# Patient Record
Sex: FEMALE | Race: BLACK OR AFRICAN AMERICAN | Employment: FULL TIME | URBAN - NONMETROPOLITAN AREA
[De-identification: names, ages, dates, MRNs, and addresses within clinical notes are randomized per-mention and may not be internally consistent; named-entity substitution may affect disease eponyms.]

---

## 2017-01-23 ENCOUNTER — OFFICE VISIT (OUTPATIENT)
Dept: ENDOCRINOLOGY | Age: 62
End: 2017-01-23

## 2017-01-23 VITALS
BODY MASS INDEX: 32.5 KG/M2 | WEIGHT: 227 LBS | DIASTOLIC BLOOD PRESSURE: 66 MMHG | HEIGHT: 70 IN | HEART RATE: 66 BPM | SYSTOLIC BLOOD PRESSURE: 130 MMHG

## 2017-01-23 DIAGNOSIS — E04.2 MULTINODULAR GOITER: Primary | ICD-10-CM

## 2017-01-23 DIAGNOSIS — E11.9 TYPE 2 DIABETES MELLITUS WITHOUT COMPLICATION, WITHOUT LONG-TERM CURRENT USE OF INSULIN (HCC): ICD-10-CM

## 2017-01-23 PROCEDURE — 3017F COLORECTAL CA SCREEN DOC REV: CPT | Performed by: CLINICAL NURSE SPECIALIST

## 2017-01-23 PROCEDURE — 99214 OFFICE O/P EST MOD 30 MIN: CPT | Performed by: CLINICAL NURSE SPECIALIST

## 2017-01-23 PROCEDURE — G8427 DOCREV CUR MEDS BY ELIG CLIN: HCPCS | Performed by: CLINICAL NURSE SPECIALIST

## 2017-01-23 PROCEDURE — 1036F TOBACCO NON-USER: CPT | Performed by: CLINICAL NURSE SPECIALIST

## 2017-01-23 PROCEDURE — G8419 CALC BMI OUT NRM PARAM NOF/U: HCPCS | Performed by: CLINICAL NURSE SPECIALIST

## 2017-01-23 PROCEDURE — 3046F HEMOGLOBIN A1C LEVEL >9.0%: CPT | Performed by: CLINICAL NURSE SPECIALIST

## 2017-01-23 PROCEDURE — 3014F SCREEN MAMMO DOC REV: CPT | Performed by: CLINICAL NURSE SPECIALIST

## 2017-01-23 PROCEDURE — G8484 FLU IMMUNIZE NO ADMIN: HCPCS | Performed by: CLINICAL NURSE SPECIALIST

## 2017-01-23 RX ORDER — NAPROXEN 500 MG/1
1 TABLET ORAL 2 TIMES DAILY PRN
Refills: 2 | COMMUNITY
Start: 2016-12-30 | End: 2022-06-02

## 2017-01-23 RX ORDER — ACETAMINOPHEN 160 MG
1 TABLET,DISINTEGRATING ORAL DAILY
COMMUNITY

## 2017-01-23 ASSESSMENT — ENCOUNTER SYMPTOMS
CHOKING: 0
STRIDOR: 0
COUGH: 0
VOICE CHANGE: 0
CHEST TIGHTNESS: 0
CONSTIPATION: 0
APNEA: 0
TROUBLE SWALLOWING: 0
EYE DISCHARGE: 0
NAUSEA: 0
WHEEZING: 0
EYE ITCHING: 0
RHINORRHEA: 0
SHORTNESS OF BREATH: 0

## 2017-03-22 ENCOUNTER — TELEPHONE (OUTPATIENT)
Dept: ENDOCRINOLOGY | Age: 62
End: 2017-03-22

## 2017-03-22 DIAGNOSIS — E11.9 TYPE 2 DIABETES MELLITUS WITHOUT COMPLICATION, WITHOUT LONG-TERM CURRENT USE OF INSULIN (HCC): Primary | ICD-10-CM

## 2017-05-23 LAB
CHOLESTEROL, TOTAL: NORMAL MG/DL
CHOLESTEROL/HDL RATIO: NORMAL
HDLC SERPL-MCNC: NORMAL MG/DL (ref 35–70)
LDL CHOLESTEROL CALCULATED: 90 MG/DL (ref 0–160)
TRIGL SERPL-MCNC: NORMAL MG/DL
VLDLC SERPL CALC-MCNC: NORMAL MG/DL

## 2017-05-25 LAB
CREATININE, URINE: 101.4
MICROALBUMIN/CREAT 24H UR: 6.8 MG/G{CREAT}
MICROALBUMIN/CREAT UR-RTO: 6.7

## 2017-05-30 ENCOUNTER — OFFICE VISIT (OUTPATIENT)
Dept: ENDOCRINOLOGY | Age: 62
End: 2017-05-30

## 2017-05-30 VITALS
DIASTOLIC BLOOD PRESSURE: 64 MMHG | HEART RATE: 76 BPM | HEIGHT: 70 IN | SYSTOLIC BLOOD PRESSURE: 124 MMHG | BODY MASS INDEX: 32 KG/M2 | WEIGHT: 223.5 LBS | RESPIRATION RATE: 20 BRPM

## 2017-05-30 DIAGNOSIS — E11.9 TYPE 2 DIABETES MELLITUS WITHOUT COMPLICATION, WITHOUT LONG-TERM CURRENT USE OF INSULIN (HCC): Primary | ICD-10-CM

## 2017-05-30 DIAGNOSIS — E04.2 MULTINODULAR GOITER: ICD-10-CM

## 2017-05-30 DIAGNOSIS — N18.3 CKD (CHRONIC KIDNEY DISEASE), STAGE 3 (MODERATE): ICD-10-CM

## 2017-05-30 DIAGNOSIS — E78.2 MIXED HYPERLIPIDEMIA: ICD-10-CM

## 2017-05-30 DIAGNOSIS — I10 ESSENTIAL HYPERTENSION: ICD-10-CM

## 2017-05-30 PROCEDURE — G8417 CALC BMI ABV UP PARAM F/U: HCPCS | Performed by: INTERNAL MEDICINE

## 2017-05-30 PROCEDURE — 99214 OFFICE O/P EST MOD 30 MIN: CPT | Performed by: INTERNAL MEDICINE

## 2017-05-30 PROCEDURE — G8427 DOCREV CUR MEDS BY ELIG CLIN: HCPCS | Performed by: INTERNAL MEDICINE

## 2017-05-30 PROCEDURE — 3017F COLORECTAL CA SCREEN DOC REV: CPT | Performed by: INTERNAL MEDICINE

## 2017-05-30 PROCEDURE — 3014F SCREEN MAMMO DOC REV: CPT | Performed by: INTERNAL MEDICINE

## 2017-05-30 PROCEDURE — 1036F TOBACCO NON-USER: CPT | Performed by: INTERNAL MEDICINE

## 2017-05-30 PROCEDURE — 3046F HEMOGLOBIN A1C LEVEL >9.0%: CPT | Performed by: INTERNAL MEDICINE

## 2017-05-31 LAB
HBA1C MFR BLD: 6.6 %
T4 FREE: 1.25
TSH SERPL DL<=0.05 MIU/L-ACNC: 3.18 UIU/ML

## 2017-06-06 ENCOUNTER — OFFICE VISIT (OUTPATIENT)
Dept: NEPHROLOGY | Age: 62
End: 2017-06-06

## 2017-06-06 VITALS
DIASTOLIC BLOOD PRESSURE: 78 MMHG | HEART RATE: 81 BPM | BODY MASS INDEX: 31.57 KG/M2 | WEIGHT: 220.5 LBS | OXYGEN SATURATION: 98 % | SYSTOLIC BLOOD PRESSURE: 118 MMHG

## 2017-06-06 DIAGNOSIS — I10 ESSENTIAL HYPERTENSION: Primary | ICD-10-CM

## 2017-06-06 DIAGNOSIS — E11.9 TYPE 2 DIABETES MELLITUS WITHOUT COMPLICATION, WITHOUT LONG-TERM CURRENT USE OF INSULIN (HCC): ICD-10-CM

## 2017-06-06 PROCEDURE — 1036F TOBACCO NON-USER: CPT | Performed by: INTERNAL MEDICINE

## 2017-06-06 PROCEDURE — G8427 DOCREV CUR MEDS BY ELIG CLIN: HCPCS | Performed by: INTERNAL MEDICINE

## 2017-06-06 PROCEDURE — 3046F HEMOGLOBIN A1C LEVEL >9.0%: CPT | Performed by: INTERNAL MEDICINE

## 2017-06-06 PROCEDURE — 99204 OFFICE O/P NEW MOD 45 MIN: CPT | Performed by: INTERNAL MEDICINE

## 2017-06-06 PROCEDURE — G8417 CALC BMI ABV UP PARAM F/U: HCPCS | Performed by: INTERNAL MEDICINE

## 2017-06-06 PROCEDURE — 3014F SCREEN MAMMO DOC REV: CPT | Performed by: INTERNAL MEDICINE

## 2017-06-06 PROCEDURE — 3017F COLORECTAL CA SCREEN DOC REV: CPT | Performed by: INTERNAL MEDICINE

## 2017-06-06 ASSESSMENT — ENCOUNTER SYMPTOMS
COUGH: 1
HEARTBURN: 0
SORE THROAT: 0
EYE PAIN: 0
ABDOMINAL PAIN: 0
SHORTNESS OF BREATH: 0
NAUSEA: 0
BACK PAIN: 0
DIARRHEA: 0
VOMITING: 0

## 2017-08-30 ENCOUNTER — OFFICE VISIT (OUTPATIENT)
Dept: ENDOCRINOLOGY | Age: 62
End: 2017-08-30
Payer: COMMERCIAL

## 2017-08-30 VITALS
HEART RATE: 77 BPM | WEIGHT: 220 LBS | RESPIRATION RATE: 18 BRPM | BODY MASS INDEX: 31.5 KG/M2 | DIASTOLIC BLOOD PRESSURE: 57 MMHG | HEIGHT: 70 IN | SYSTOLIC BLOOD PRESSURE: 123 MMHG

## 2017-08-30 DIAGNOSIS — E78.2 MIXED HYPERLIPIDEMIA: ICD-10-CM

## 2017-08-30 DIAGNOSIS — E04.2 MULTINODULAR GOITER: ICD-10-CM

## 2017-08-30 DIAGNOSIS — E11.8 TYPE 2 DIABETES MELLITUS WITH COMPLICATION, WITHOUT LONG-TERM CURRENT USE OF INSULIN (HCC): Primary | ICD-10-CM

## 2017-08-30 PROCEDURE — G8417 CALC BMI ABV UP PARAM F/U: HCPCS | Performed by: CLINICAL NURSE SPECIALIST

## 2017-08-30 PROCEDURE — G8427 DOCREV CUR MEDS BY ELIG CLIN: HCPCS | Performed by: CLINICAL NURSE SPECIALIST

## 2017-08-30 PROCEDURE — 1036F TOBACCO NON-USER: CPT | Performed by: CLINICAL NURSE SPECIALIST

## 2017-08-30 PROCEDURE — 3017F COLORECTAL CA SCREEN DOC REV: CPT | Performed by: CLINICAL NURSE SPECIALIST

## 2017-08-30 PROCEDURE — 3014F SCREEN MAMMO DOC REV: CPT | Performed by: CLINICAL NURSE SPECIALIST

## 2017-08-30 PROCEDURE — 3046F HEMOGLOBIN A1C LEVEL >9.0%: CPT | Performed by: CLINICAL NURSE SPECIALIST

## 2017-08-30 PROCEDURE — 99214 OFFICE O/P EST MOD 30 MIN: CPT | Performed by: CLINICAL NURSE SPECIALIST

## 2017-08-30 RX ORDER — METFORMIN HYDROCHLORIDE 500 MG/1
500 TABLET, EXTENDED RELEASE ORAL
Qty: 30 TABLET | Refills: 4 | Status: SHIPPED | OUTPATIENT
Start: 2017-08-30 | End: 2017-12-04

## 2017-08-30 RX ORDER — ATORVASTATIN CALCIUM 20 MG/1
20 TABLET, FILM COATED ORAL DAILY
COMMUNITY
End: 2018-03-21 | Stop reason: ALTCHOICE

## 2017-08-30 ASSESSMENT — ENCOUNTER SYMPTOMS
EYE REDNESS: 0
ABDOMINAL PAIN: 0
VOICE CHANGE: 0
WHEEZING: 0
COUGH: 0
DIARRHEA: 0
CONSTIPATION: 0
CHEST TIGHTNESS: 0
SHORTNESS OF BREATH: 0
NAUSEA: 0

## 2017-11-27 DIAGNOSIS — E04.2 MULTINODULAR GOITER: ICD-10-CM

## 2017-12-04 ENCOUNTER — OFFICE VISIT (OUTPATIENT)
Dept: ENDOCRINOLOGY | Age: 62
End: 2017-12-04
Payer: COMMERCIAL

## 2017-12-04 ENCOUNTER — TELEPHONE (OUTPATIENT)
Dept: ENDOCRINOLOGY | Age: 62
End: 2017-12-04

## 2017-12-04 VITALS
BODY MASS INDEX: 31.42 KG/M2 | DIASTOLIC BLOOD PRESSURE: 73 MMHG | HEART RATE: 68 BPM | RESPIRATION RATE: 16 BRPM | WEIGHT: 219.5 LBS | HEIGHT: 70 IN | SYSTOLIC BLOOD PRESSURE: 138 MMHG

## 2017-12-04 DIAGNOSIS — E11.9 TYPE 2 DIABETES MELLITUS WITHOUT COMPLICATION, WITHOUT LONG-TERM CURRENT USE OF INSULIN (HCC): Primary | ICD-10-CM

## 2017-12-04 DIAGNOSIS — E04.2 MULTINODULAR GOITER: ICD-10-CM

## 2017-12-04 DIAGNOSIS — E78.2 MIXED HYPERLIPIDEMIA: ICD-10-CM

## 2017-12-04 PROCEDURE — G8484 FLU IMMUNIZE NO ADMIN: HCPCS | Performed by: CLINICAL NURSE SPECIALIST

## 2017-12-04 PROCEDURE — 3017F COLORECTAL CA SCREEN DOC REV: CPT | Performed by: CLINICAL NURSE SPECIALIST

## 2017-12-04 PROCEDURE — 99214 OFFICE O/P EST MOD 30 MIN: CPT | Performed by: CLINICAL NURSE SPECIALIST

## 2017-12-04 PROCEDURE — 3014F SCREEN MAMMO DOC REV: CPT | Performed by: CLINICAL NURSE SPECIALIST

## 2017-12-04 PROCEDURE — 3044F HG A1C LEVEL LT 7.0%: CPT | Performed by: CLINICAL NURSE SPECIALIST

## 2017-12-04 PROCEDURE — G8427 DOCREV CUR MEDS BY ELIG CLIN: HCPCS | Performed by: CLINICAL NURSE SPECIALIST

## 2017-12-04 PROCEDURE — G8417 CALC BMI ABV UP PARAM F/U: HCPCS | Performed by: CLINICAL NURSE SPECIALIST

## 2017-12-04 PROCEDURE — 1036F TOBACCO NON-USER: CPT | Performed by: CLINICAL NURSE SPECIALIST

## 2017-12-04 ASSESSMENT — ENCOUNTER SYMPTOMS
NAUSEA: 0
ABDOMINAL PAIN: 0
CHEST TIGHTNESS: 0
EYE REDNESS: 0
CONSTIPATION: 0
COUGH: 0
WHEEZING: 0
SHORTNESS OF BREATH: 0
VOICE CHANGE: 0
DIARRHEA: 0

## 2017-12-12 DIAGNOSIS — E11.9 TYPE 2 DIABETES MELLITUS WITHOUT COMPLICATION, WITHOUT LONG-TERM CURRENT USE OF INSULIN (HCC): Primary | ICD-10-CM

## 2018-01-08 NOTE — PROGRESS NOTES
Attending addendum:  I have reviewed NP note and relevant data in detail. I agree with findings and plans of care as documented in NP nurse note.
Hypertension     Osteoarthritis     Thyroid disease     Torn rotator cuff     Right    Type II or unspecified type diabetes mellitus without mention of complication, not stated as uncontrolled       Past Surgical History:   Procedure Laterality Date    CERVICAL [address] SURGERY  2013    CHOLECYSTECTOMY  2009   [address]  2011    SHOULDER SURGERY Left 2007    VEIN SURGERY  2015    Varicose Vein     Family History   Problem Relation Age of Onset    Diabetes Mother    [facility] Arthritis Mother     High Blood Pressure Mother     Other Mother      sarcoidosis, lung disease    Diabetes Sister     High Blood Pressure Sister     Heart Defect Sister     Diabetes Brother     Other Brother      had surgery for sarcoidosis    High Blood Pressure Sister      Social History   Substance Use Topics    Smoking status: Never Smoker    Smokeless tobacco: Never Used    Alcohol use No      Current Outpatient Prescriptions   Medication Sig Dispense Refill    metFORMIN (GLUCOPHAGE) 500 MG tablet TAKE 1 TABLET DAILY WITH SUPPER 90 tablet 1    sitaGLIPtan-metformin (JANUMET)  MG per tablet Take 1 tablet by mouth 2 times daily (with meals) (Patient taking differently: Take 1 tablet by mouth daily ) 180 tablet 1    Cholecalciferol (VITAMIN D3) 5000 UNITS TABS Take 1 tablet by mouth daily      naproxen (NAPROSYN) 500 MG tablet Take 1 tablet by mouth 2 times daily as needed  2    levocetirizine (XYZAL) 5 MG tablet Take 5 mg by mouth nightly      glucose blood VI test strips (ONE TOUCH ULTRA TEST) strip Check blood sugar 2 x daily (Dx: E11.9) 200 strip 1    ONETOUCH DELICA LANCETS FINE MISC Check blood sugar 2 x daily (Dx: E11.9) 200 each 1    aspirin 81 MG tablet Take 81 mg by mouth daily.  hydroxychloroquine (PLAQUENIL) 200 MG tablet Take 200 mg by mouth 2 times daily.  quinapril-hydrochlorothiazide (ACCURETIC) 20-12.5 MG per tablet Take 1 tablet by mouth daily.       atorvastatin (LIPITOR) 20 MG tablet

## 2018-03-14 LAB
AVERAGE GLUCOSE: NORMAL
HBA1C MFR BLD: 6.1 %

## 2018-03-21 ENCOUNTER — OFFICE VISIT (OUTPATIENT)
Dept: INTERNAL MEDICINE CLINIC | Age: 63
End: 2018-03-21
Payer: COMMERCIAL

## 2018-03-21 VITALS
BODY MASS INDEX: 31.5 KG/M2 | HEIGHT: 70 IN | DIASTOLIC BLOOD PRESSURE: 63 MMHG | HEART RATE: 63 BPM | WEIGHT: 220 LBS | SYSTOLIC BLOOD PRESSURE: 137 MMHG | RESPIRATION RATE: 16 BRPM

## 2018-03-21 DIAGNOSIS — E11.8 TYPE 2 DIABETES MELLITUS WITH COMPLICATION, WITHOUT LONG-TERM CURRENT USE OF INSULIN (HCC): Primary | ICD-10-CM

## 2018-03-21 DIAGNOSIS — E04.2 MULTINODULAR GOITER: ICD-10-CM

## 2018-03-21 PROCEDURE — 99214 OFFICE O/P EST MOD 30 MIN: CPT | Performed by: CLINICAL NURSE SPECIALIST

## 2018-03-21 PROCEDURE — 3044F HG A1C LEVEL LT 7.0%: CPT | Performed by: CLINICAL NURSE SPECIALIST

## 2018-03-21 PROCEDURE — G8427 DOCREV CUR MEDS BY ELIG CLIN: HCPCS | Performed by: CLINICAL NURSE SPECIALIST

## 2018-03-21 PROCEDURE — 3014F SCREEN MAMMO DOC REV: CPT | Performed by: CLINICAL NURSE SPECIALIST

## 2018-03-21 PROCEDURE — G8417 CALC BMI ABV UP PARAM F/U: HCPCS | Performed by: CLINICAL NURSE SPECIALIST

## 2018-03-21 PROCEDURE — 1036F TOBACCO NON-USER: CPT | Performed by: CLINICAL NURSE SPECIALIST

## 2018-03-21 PROCEDURE — 3017F COLORECTAL CA SCREEN DOC REV: CPT | Performed by: CLINICAL NURSE SPECIALIST

## 2018-03-21 PROCEDURE — G8484 FLU IMMUNIZE NO ADMIN: HCPCS | Performed by: CLINICAL NURSE SPECIALIST

## 2018-03-21 ASSESSMENT — ENCOUNTER SYMPTOMS
WHEEZING: 0
NAUSEA: 0
SHORTNESS OF BREATH: 0
EYE REDNESS: 0
ABDOMINAL PAIN: 0
VOICE CHANGE: 0
COUGH: 0
CHEST TIGHTNESS: 0
DIARRHEA: 0
CONSTIPATION: 0

## 2018-03-21 NOTE — PROGRESS NOTES
tinnitus and voice change. Eyes: Negative for redness and visual disturbance. Respiratory: Negative for cough, chest tightness, shortness of breath and wheezing. Cardiovascular: Negative for chest pain, palpitations and leg swelling. Gastrointestinal: Negative for abdominal pain, constipation, diarrhea and nausea. Endocrine: Negative. Genitourinary: Negative for difficulty urinating, dysuria, frequency and hematuria. Musculoskeletal: Negative for gait problem, myalgias and neck pain. Skin: Negative for rash. Neurological: Negative for dizziness, tremors, facial asymmetry, weakness, numbness and headaches. Hematological: Negative for adenopathy. Psychiatric/Behavioral: Negative for agitation, confusion, sleep disturbance and suicidal ideas. The patient is not nervous/anxious and is not hyperactive. Objective:     Physical Exam   Constitutional: She is oriented to person, place, and time. She appears well-developed and well-nourished. No distress. HENT:   Head: Normocephalic and atraumatic. Right Ear: External ear normal.   Left Ear: External ear normal.   Nose: Nose normal.   Eyes: Conjunctivae and EOM are normal. Pupils are equal, round, and reactive to light. Right eye exhibits no discharge. Left eye exhibits no discharge. No scleral icterus. Neck: Normal range of motion. Neck supple. No thyromegaly present. Cardiovascular: Normal rate, regular rhythm, normal heart sounds and intact distal pulses. No murmur heard. Pulmonary/Chest: Effort normal and breath sounds normal. No stridor. No respiratory distress. She has no wheezes. She has no rales. Abdominal: Soft. Bowel sounds are normal. There is no tenderness. Musculoskeletal: Normal range of motion. She exhibits no edema or tenderness. Lymphadenopathy:     She has no cervical adenopathy. Neurological: She is alert and oriented to person, place, and time. No cranial nerve deficit.  Coordination normal.   Skin: Skin Continue janumet 50/500 mg tablet - one tablet daily ( maximum daily dose of metformin 2000 mg)   Check blood sugar once daily alternate fasting with before meals and bedtime      Continue to exercise, be active as much as possible daily  Continue with portion control, healthy choices, lots of vegetables and whole grains, lean meat, fish, chicken, turkey    Follow with family doctor for elevated cholesterol at upcoming appointment and cardiologist as well. Will do periodic thyroid ultrasound for stability - last US 11/2017. Return in about 4 months (around 7/21/2018).        Electronically signed by LEONA Stewart on 3/21/2018 at 8:58 AM

## 2018-03-21 NOTE — LETTER
[facility]  Phone: [phone]  Fax: [address], APRN        March 21, 2018     Tj Moore  [address] [address]    Patient: Cecilia Rios  MR Number: [id number]  YOB: 1955  Date of Visit: 3/21/2018    Dear Dr. Tj Moore: Thank you for the request for consultation for Mary Jalloh to me for the evaluation of Type 2 Diabetes Mellitus and multinodular goiter. Below are the relevant portions of my assessment and plan of care. Assessment:       Plan:     /63 (Site: Left Arm, Position: Sitting, Cuff Size: Large Adult)   Pulse 63   Resp 16   Ht 5' 10.08\" (1.78 m)   Wt 220 lb (99.8 kg)   BMI 31.50 kg/m²    1. Type 2 diabetes mellitus with complication, without long-term current use of insulin (HCC) - good glycemic control - no change in medication at this time. Glycemic goals, monitoring, therapeutic lifestyle reviewed. A1c on follow up currently 6.1%. Tolerating DM medications without adverse effects     2. Multinodular goiter - stable sub centimeter nodules by US 11/2017 - asymptomatic -   Will follow with periotic US for stabiltiy  FNA benign 2014  Discussed symptoms to report between visits  Historically euthyroid - recheck thyroid function prior to follow up     3. Mixed hyperlipidemia -  patient was taken off statin by Dr. Shell Castelan for elevated liver panel. 3/2018 chol 244, trig 127, HDL 53, , liver panel in goal currently - discussed goals with patient and restart of statin - she relates she would like to discuss with cardiologist and PCP with scheduled upcoming appointments. 4. Hypertension - controlled with medication    5. CAD - following with cardiology - Dr. Shell Castelan    6.  History of Elevated creatinine - she is following with nephrology, Dr. Analilia Ramirez  5/2017  BUN 19, creat 1.12, GFR 54 Malb/creat 6.7 On ACE    Plan:

## 2018-06-01 LAB
BUN BLDV-MCNC: 15 MG/DL
CALCIUM SERPL-MCNC: 10 MG/DL
CHLORIDE BLD-SCNC: 104 MMOL/L
CO2: 23 MMOL/L
CREAT SERPL-MCNC: 1.18 MG/DL
GFR CALCULATED: 58
GLUCOSE BLD-MCNC: 98 MG/DL
MICROALBUMIN UR-MCNC: 6.5
POTASSIUM SERPL-SCNC: 4.6 MMOL/L
SODIUM BLD-SCNC: 141 MMOL/L

## 2018-06-05 ENCOUNTER — OFFICE VISIT (OUTPATIENT)
Dept: NEPHROLOGY | Age: 63
End: 2018-06-05
Payer: COMMERCIAL

## 2018-06-05 VITALS
HEIGHT: 70 IN | DIASTOLIC BLOOD PRESSURE: 73 MMHG | BODY MASS INDEX: 31.35 KG/M2 | SYSTOLIC BLOOD PRESSURE: 118 MMHG | HEART RATE: 72 BPM | WEIGHT: 219 LBS | OXYGEN SATURATION: 100 %

## 2018-06-05 DIAGNOSIS — I10 ESSENTIAL HYPERTENSION: ICD-10-CM

## 2018-06-05 DIAGNOSIS — N18.30 CKD (CHRONIC KIDNEY DISEASE) STAGE 3, GFR 30-59 ML/MIN (HCC): Primary | ICD-10-CM

## 2018-06-05 PROCEDURE — 1036F TOBACCO NON-USER: CPT | Performed by: INTERNAL MEDICINE

## 2018-06-05 PROCEDURE — G8417 CALC BMI ABV UP PARAM F/U: HCPCS | Performed by: INTERNAL MEDICINE

## 2018-06-05 PROCEDURE — G8427 DOCREV CUR MEDS BY ELIG CLIN: HCPCS | Performed by: INTERNAL MEDICINE

## 2018-06-05 PROCEDURE — 99213 OFFICE O/P EST LOW 20 MIN: CPT | Performed by: INTERNAL MEDICINE

## 2018-06-05 PROCEDURE — 3017F COLORECTAL CA SCREEN DOC REV: CPT | Performed by: INTERNAL MEDICINE

## 2018-06-05 RX ORDER — OMEPRAZOLE 40 MG/1
40 CAPSULE, DELAYED RELEASE ORAL DAILY
COMMUNITY

## 2018-07-17 LAB
AVERAGE GLUCOSE: NORMAL
HBA1C MFR BLD: 5.9 %
TSH SERPL DL<=0.05 MIU/L-ACNC: 3.06 UIU/ML

## 2018-07-23 ENCOUNTER — OFFICE VISIT (OUTPATIENT)
Dept: INTERNAL MEDICINE CLINIC | Age: 63
End: 2018-07-23
Payer: COMMERCIAL

## 2018-07-23 VITALS
HEIGHT: 70 IN | BODY MASS INDEX: 30.92 KG/M2 | WEIGHT: 216 LBS | SYSTOLIC BLOOD PRESSURE: 134 MMHG | RESPIRATION RATE: 16 BRPM | DIASTOLIC BLOOD PRESSURE: 66 MMHG | HEART RATE: 62 BPM

## 2018-07-23 DIAGNOSIS — E11.8 TYPE 2 DIABETES MELLITUS WITH COMPLICATION, WITHOUT LONG-TERM CURRENT USE OF INSULIN (HCC): Primary | ICD-10-CM

## 2018-07-23 DIAGNOSIS — E04.2 MULTINODULAR GOITER: ICD-10-CM

## 2018-07-23 DIAGNOSIS — E78.2 MIXED HYPERLIPIDEMIA: ICD-10-CM

## 2018-07-23 PROCEDURE — 99214 OFFICE O/P EST MOD 30 MIN: CPT | Performed by: CLINICAL NURSE SPECIALIST

## 2018-07-23 PROCEDURE — G8427 DOCREV CUR MEDS BY ELIG CLIN: HCPCS | Performed by: CLINICAL NURSE SPECIALIST

## 2018-07-23 PROCEDURE — 3017F COLORECTAL CA SCREEN DOC REV: CPT | Performed by: CLINICAL NURSE SPECIALIST

## 2018-07-23 PROCEDURE — 1036F TOBACCO NON-USER: CPT | Performed by: CLINICAL NURSE SPECIALIST

## 2018-07-23 PROCEDURE — 3044F HG A1C LEVEL LT 7.0%: CPT | Performed by: CLINICAL NURSE SPECIALIST

## 2018-07-23 PROCEDURE — G8417 CALC BMI ABV UP PARAM F/U: HCPCS | Performed by: CLINICAL NURSE SPECIALIST

## 2018-07-23 PROCEDURE — 2022F DILAT RTA XM EVC RTNOPTHY: CPT | Performed by: CLINICAL NURSE SPECIALIST

## 2018-07-23 ASSESSMENT — ENCOUNTER SYMPTOMS
VOICE CHANGE: 0
WHEEZING: 0
DIARRHEA: 0
EYE REDNESS: 0
COUGH: 0
SHORTNESS OF BREATH: 0
ABDOMINAL PAIN: 0
CONSTIPATION: 0
CHEST TIGHTNESS: 0
NAUSEA: 0

## 2018-07-23 ASSESSMENT — PATIENT HEALTH QUESTIONNAIRE - PHQ9
SUM OF ALL RESPONSES TO PHQ9 QUESTIONS 1 & 2: 0
2. FEELING DOWN, DEPRESSED OR HOPELESS: 0
SUM OF ALL RESPONSES TO PHQ QUESTIONS 1-9: 0
1. LITTLE INTEREST OR PLEASURE IN DOING THINGS: 0

## 2018-07-23 NOTE — PROGRESS NOTES
Diabetes Sister     High Blood Pressure Sister     Heart Defect Sister     Diabetes Brother     Other Brother         had surgery for sarcoidosis    High Blood Pressure Sister      Social History   Substance Use Topics    Smoking status: Never Smoker    Smokeless tobacco: Never Used    Alcohol use No      Current Outpatient Prescriptions   Medication Sig Dispense Refill    metFORMIN (GLUCOPHAGE) 500 MG tablet TAKE 1 TABLET DAILY WITH SUPPER 90 tablet 1    blood glucose test strips (ONETOUCH VERIO) strip 1 each by In Vitro route daily As needed. 100 each 3    ONE TOUCH LANCETS MISC 1 each by Does not apply route daily 100 each 3    omeprazole (PRILOSEC) 40 MG delayed release capsule Take 40 mg by mouth daily      sitaGLIPtan-metformin (JANUMET)  MG per tablet Take 1 tablet by mouth 2 times daily (with meals) 180 tablet 1    Cholecalciferol (VITAMIN D3) 2000 units CAPS Take 1 tablet by mouth daily      naproxen (NAPROSYN) 500 MG tablet Take 1 tablet by mouth 2 times daily as needed  2    levocetirizine (XYZAL) 5 MG tablet Take 5 mg by mouth nightly      glucose blood VI test strips (ONE TOUCH ULTRA TEST) strip Check blood sugar 2 x daily (Dx: E11.9) 200 strip 1    ONETOUCH DELICA LANCETS FINE MISC Check blood sugar 2 x daily (Dx: E11.9) 200 each 1    aspirin 81 MG tablet Take 81 mg by mouth daily.  hydroxychloroquine (PLAQUENIL) 200 MG tablet Take 200 mg by mouth 2 times daily.  quinapril-hydrochlorothiazide (ACCURETIC) 20-12.5 MG per tablet Take 1 tablet by mouth daily. No current facility-administered medications for this visit. No Known Allergies    Subjective:      Review of Systems   Constitutional: Negative for appetite change, chills, fatigue and unexpected weight change. HENT: Negative for hearing loss, tinnitus and voice change. Eyes: Negative for redness and visual disturbance.    Respiratory: Negative for cough, chest tightness, shortness of breath and wheezing. Cardiovascular: Negative for chest pain, palpitations and leg swelling. Gastrointestinal: Negative for abdominal pain, constipation, diarrhea and nausea. Endocrine: Negative. Genitourinary: Negative for difficulty urinating, dysuria, frequency and hematuria. Musculoskeletal: Positive for arthralgias. Negative for gait problem, myalgias and neck pain. Skin: Negative for rash. Neurological: Negative for dizziness, tremors, facial asymmetry, weakness, numbness and headaches. Hematological: Negative for adenopathy. Psychiatric/Behavioral: Negative for agitation, confusion, sleep disturbance and suicidal ideas. The patient is not nervous/anxious and is not hyperactive. Objective:     Physical Exam   Constitutional: She is oriented to person, place, and time. She appears well-developed and well-nourished. No distress. HENT:   Head: Normocephalic and atraumatic. Right Ear: External ear normal.   Left Ear: External ear normal.   Nose: Nose normal.   Eyes: Conjunctivae and EOM are normal. Pupils are equal, round, and reactive to light. Right eye exhibits no discharge. Left eye exhibits no discharge. No scleral icterus. Neck: Normal range of motion. Neck supple. No thyromegaly present. Cardiovascular: Normal rate, regular rhythm, normal heart sounds and intact distal pulses. No murmur heard. Pulmonary/Chest: Effort normal and breath sounds normal. No stridor. No respiratory distress. She has no wheezes. She has no rales. Abdominal: Soft. Bowel sounds are normal. There is no tenderness. Musculoskeletal: Normal range of motion. She exhibits no edema or tenderness. Lymphadenopathy:     She has no cervical adenopathy. Neurological: She is alert and oriented to person, place, and time. No cranial nerve deficit. Coordination normal.   Skin: Skin is warm and dry. She is not diaphoretic. Psychiatric: She has a normal mood and affect.  Her behavior is normal. Judgment and thought content normal.       /66 (Site: Right Arm, Position: Sitting, Cuff Size: Large Adult)   Pulse 62   Resp 16   Ht 5' 10.08\" (1.78 m)   Wt 216 lb (98 kg)   BMI 30.92 kg/m²     Wt Readings from Last 3 Encounters:   07/23/18 216 lb (98 kg)   06/05/18 219 lb (99.3 kg)   03/21/18 220 lb (99.8 kg)       Lab Results   Component Value Date    WBC 8.8 08/23/2016    HGB 10.1 (L) 08/23/2016    HCT 32.6 (L) 08/23/2016     08/23/2016    ALKPHOS 107 05/26/2015    ALT 17 03/06/2015    AST 22 03/06/2015     06/01/2018    K 4.6 06/01/2018     06/01/2018    CREATININE 1.18 06/01/2018    BUN 15 06/01/2018    CO2 23 06/01/2018    TSH 3.060 07/17/2018    LABA1C 5.9 07/17/2018    LABMICR 6.5 06/01/2018    LABCREA 101.4 05/25/2017       Patient was asked to remove their socks and shoes and a full foot exam was performed.  The following was found during the patient's foot exam:                  [x]   Callus   [x]   Left      [x]   Right                                       [x]   Normal Exam  Monofilament sensation   [x]   Normal                   []   Abnormal   Pulses   [x]   Normal                      []   Abnormal    No open lesions    Assessment/Plan:           Type 2 diabetes mellitus - in good control A1c 5.9% this month - no lows. tolerating medications without adverse effect - to continue. discussed glycemic goals, monitoring, therapeutic lifestyle. Adverse symptoms of medication to report    2. . Multinodular goiter - stable sub centimeter nodules by US 11/2017 - asymptomatic -   Will follow with periodic US for stabiltiy  FNA benign 2014  Discussed symptoms to report between visits  Historically euthyroid - 7/2018 TSH 3.0, FT4 1.17.    3. Mixed hyperlipidemia -  patient was taken off statin by Dr. Denilson Marie for elevated liver panel. 3/2018 chol 244, trig 127, HDL 53, , liver panel in goal currently - stated restarted on statin previously by cardiology but she stopped it for no reason. on 7/23/2018 at 12:34 PM

## 2018-07-23 NOTE — LETTER
in regard to follow up appointments with area Endocrinologists, Internal Medicine Specialists and family doctor. Patient has opted to follow with family doctor, Yasmine Hartamn spent 25 minutes with the patient. Greater than 50% of visit was spent face to face discussing glycemic management and other diagnoses as above, reviewing labs, medication, answering questions      Plan:      Continue metformin 500 mg tablet with supper   Continue janumet 50/500 mg tablet - one tablet daily ( maximum daily dose of metformin 2000 mg)   Check blood sugar once daily alternate fasting with before meals and bedtime      Continue to exercise, be active as much as possible daily  Continue with portion control, healthy choices, lots of vegetables and whole grains, lean meat, fish, chicken, turkey     Follow with family doctor for elevated cholesterol at upcoming appointment and cardiologist as well. - patient will restart lipitor 10 mg daily (from cardiologist) - advised to recheck cholesterol and liver panel six weeks after restarting - call for abdominal pain, muscle aches or other adverse effects     Will do periodic thyroid ultrasound for stability - last US 11/2017. - should repeat one year from previous if remains asymptomatic - Due 11/2018    Patient will follow with PCP, Read Beverage    No Follow-up on file. If you have questions, please do not hesitate to call me. I look forward to following Maria M along with you.     Sincerely,        LEONA Beck - CNS

## 2018-08-15 DIAGNOSIS — E11.9 TYPE 2 DIABETES MELLITUS WITHOUT COMPLICATION, WITHOUT LONG-TERM CURRENT USE OF INSULIN (HCC): ICD-10-CM

## 2018-08-16 RX ORDER — SITAGLIPTIN AND METFORMIN HYDROCHLORIDE 500; 50 MG/1; MG/1
TABLET, FILM COATED ORAL
Qty: 180 TABLET | Refills: 0 | Status: SHIPPED | OUTPATIENT
Start: 2018-08-16

## 2019-06-05 LAB
ANION GAP SERPL CALCULATED.3IONS-SCNC: 8 MMOL/L (ref 4–12)
BUN BLDV-MCNC: 25 MG/DL (ref 5–27)
CALCIUM SERPL-MCNC: 10.2 MG/DL (ref 8.5–10.5)
CHLORIDE BLD-SCNC: 106 MMOL/L (ref 98–109)
CO2: 27 MMOL/L (ref 22–32)
CREAT SERPL-MCNC: 1.26 MG/DL (ref 0.4–1)
CREATINE, URINE: 83.26 MG/DL
EGFR AFRICAN AMERICAN: 52 ML/MIN/1.73SQ.M
EGFR IF NONAFRICAN AMERICAN: 43 ML/MIN/1.73SQ.M
GLUCOSE: 74 MG/DL (ref 65–99)
POTASSIUM SERPL-SCNC: 4.4 MMOL/L (ref 3.5–5)
PROTEIN, URINE: 110 MG/L
PROTEIN/CREAT RATIO: 0.13
SODIUM BLD-SCNC: 141 MMOL/L (ref 134–146)

## 2019-06-13 ENCOUNTER — OFFICE VISIT (OUTPATIENT)
Dept: NEPHROLOGY | Age: 64
End: 2019-06-13
Payer: COMMERCIAL

## 2019-06-13 VITALS
BODY MASS INDEX: 31.07 KG/M2 | DIASTOLIC BLOOD PRESSURE: 73 MMHG | HEART RATE: 78 BPM | WEIGHT: 217 LBS | SYSTOLIC BLOOD PRESSURE: 114 MMHG | OXYGEN SATURATION: 98 %

## 2019-06-13 DIAGNOSIS — I10 ESSENTIAL HYPERTENSION: ICD-10-CM

## 2019-06-13 DIAGNOSIS — N18.30 CKD (CHRONIC KIDNEY DISEASE) STAGE 3, GFR 30-59 ML/MIN (HCC): Primary | ICD-10-CM

## 2019-06-13 PROCEDURE — 99213 OFFICE O/P EST LOW 20 MIN: CPT | Performed by: INTERNAL MEDICINE

## 2019-06-13 PROCEDURE — G8427 DOCREV CUR MEDS BY ELIG CLIN: HCPCS | Performed by: INTERNAL MEDICINE

## 2019-06-13 PROCEDURE — 1036F TOBACCO NON-USER: CPT | Performed by: INTERNAL MEDICINE

## 2019-06-13 PROCEDURE — 3017F COLORECTAL CA SCREEN DOC REV: CPT | Performed by: INTERNAL MEDICINE

## 2019-06-13 PROCEDURE — G8417 CALC BMI ABV UP PARAM F/U: HCPCS | Performed by: INTERNAL MEDICINE

## 2019-06-13 RX ORDER — LORATADINE 10 MG/1
TABLET ORAL
Refills: 3 | COMMUNITY
Start: 2019-05-17 | End: 2022-06-02

## 2019-06-13 RX ORDER — ATORVASTATIN CALCIUM 40 MG/1
TABLET, FILM COATED ORAL
COMMUNITY
Start: 2018-06-21

## 2019-06-13 RX ORDER — IBUPROFEN 200 MG
200 TABLET ORAL PRN
COMMUNITY
Start: 2018-09-11 | End: 2022-06-02

## 2019-06-13 RX ORDER — CYCLOBENZAPRINE HCL 10 MG
TABLET ORAL
Refills: 1 | COMMUNITY
Start: 2019-05-31

## 2019-06-13 NOTE — PROGRESS NOTES
times daily as needed  2    levocetirizine (XYZAL) 5 MG tablet Take 5 mg by mouth nightly      glucose blood VI test strips (ONE TOUCH ULTRA TEST) strip Check blood sugar 2 x daily (Dx: E11.9) 200 strip 1    ONETOUCH DELICA LANCETS FINE MISC Check blood sugar 2 x daily (Dx: E11.9) 200 each 1    aspirin 81 MG tablet Take 81 mg by mouth daily.  hydroxychloroquine (PLAQUENIL) 200 MG tablet Take 200 mg by mouth 2 times daily.  quinapril-hydrochlorothiazide (ACCURETIC) 20-12.5 MG per tablet Take 1 tablet by mouth daily. Vitals     /73 (Site: Left Upper Arm, Position: Sitting, Cuff Size: Large Adult)   Pulse 78   Wt 217 lb (98.4 kg)   SpO2 98%   BMI 31.07 kg/m²  Wt Readings from Last 3 Encounters:   06/13/19 217 lb (98.4 kg)   07/23/18 216 lb (98 kg)   06/05/18 219 lb (99.3 kg)        Physical Exam     General -- no distress  Lungs -- clear  Heart -- S1, S2 heard, JVD - no  Abdomen - soft, non-tender  Extremities -- no edema  CNS - awake and alert    Labs, Radiology and Tests    Labs -    5/17 5/18 6/19         Potassium 4.7 4.6 4.4         BUN 26 15 25         Creatinine 1.05 1.18 1.26         eGFR 67 58 52                     UPCR  103 130         UMCR                          Renal Ultrasound Scan -- not done       Other : old lab data and endocrinology notes have been reviewed and noted that patient's baseline creatinine ranged anywhere between 1.01-1.3, within 2016. Assessment    1. Renal - as per MDRD patient's GFR currently 52 ML/minute. she is stage 3 for now  - CKD possibly from hypertension and diabetes. Slowly worsening .   - order a renal US scan. Works in a factory and hot. Drink more than 60  Ounces of fluids  2. Essential hypertension-running well at this time continue current medications. 3. Diabetes mellitus very well controlled. Last A1c 5.9 in april  4. Arthritis she is on Plaquenil advised only take when necessary Naprosyn if required.   5. Trace proteinuria currently on ACE inhibitor probably from diabetes. 6. Follow up in 12 months. 7. Medications reviewed. Tests and orders placed this Encounter     Orders Placed This Encounter   Procedures    US Renal Kidney    Basic Metabolic Panel    Basic Metabolic Panel    Microalbumin / Creatinine Urine Ratio    Protein / creatinine ratio, urine    Urinalysis with Microscopic       Fara Breen M.D  [facility].

## 2019-06-14 ENCOUNTER — TELEPHONE (OUTPATIENT)
Dept: NEPHROLOGY | Age: 64
End: 2019-06-14

## 2019-06-14 NOTE — TELEPHONE ENCOUNTER
----- Message from Zuleika Aguilar MD sent at 6/14/2019 10:45 AM EDT -----  Please let the patient know that the kidney ultrasound scan is normal except she has some evidence of chronic kidney disease

## 2019-09-16 ENCOUNTER — TELEPHONE (OUTPATIENT)
Dept: ADMINISTRATIVE | Age: 64
End: 2019-09-16

## 2019-11-19 LAB
ANION GAP SERPL CALCULATED.3IONS-SCNC: 9 MMOL/L (ref 4–12)
BUN BLDV-MCNC: 20 MG/DL (ref 5–27)
CALCIUM SERPL-MCNC: 10.1 MG/DL (ref 8.5–10.5)
CHLORIDE BLD-SCNC: 107 MMOL/L (ref 98–109)
CO2: 26 MMOL/L (ref 22–32)
CREAT SERPL-MCNC: 1.17 MG/DL (ref 0.4–1)
EGFR AFRICAN AMERICAN: 57 ML/MIN/1.73SQ.M
EGFR IF NONAFRICAN AMERICAN: 47 ML/MIN/1.73SQ.M
GLUCOSE: 90 MG/DL (ref 65–99)
POTASSIUM SERPL-SCNC: 4.6 MMOL/L (ref 3.5–5)
SODIUM BLD-SCNC: 142 MMOL/L (ref 134–146)

## 2020-06-02 LAB
ANION GAP SERPL CALCULATED.3IONS-SCNC: 11 MMOL/L (ref 5–15)
BUN BLDV-MCNC: 20 MG/DL (ref 5–27)
CALCIUM SERPL-MCNC: 10 MG/DL (ref 8.5–10.5)
CHLORIDE BLD-SCNC: 107 MMOL/L (ref 98–109)
CO2: 26 MMOL/L (ref 22–32)
CREAT SERPL-MCNC: 1.32 MG/DL (ref 0.4–1)
EGFR AFRICAN AMERICAN: 49 ML/MIN/1.73SQ.M
EGFR IF NONAFRICAN AMERICAN: 41 ML/MIN/1.73SQ.M
GLUCOSE: 91 MG/DL (ref 65–99)
POTASSIUM SERPL-SCNC: 4.5 MMOL/L (ref 3.5–5)
SODIUM BLD-SCNC: 144 MMOL/L (ref 134–146)

## 2020-06-03 LAB
APPEARANCE: CLEAR
BILIRUBIN: NEGATIVE
COLOR: YELLOW
CREATINE, URINE: 108.14 MG/DL
CREATINE, URINE: 108.14 MG/DL
GLUCOSE BLD-MCNC: NEGATIVE MG/DL
KETONES, URINE: NEGATIVE MG/DL
LEUKOCYTE ESTERASE, URINE: ABNORMAL
MICROALBUMIN/CREAT 24H UR: <0.7 MG/DL (ref 0–1.9)
MICROALBUMIN/CREAT UR-RTO: <1.8 MG/G CREAT (ref 0–30)
NITRITE, URINE: NEGATIVE
OCCULT BLOOD,URINE: NEGATIVE
OTHER MICROSCOPIC ELEMENTS: ABNORMAL
PH: 6 (ref 5–8.5)
PROTEIN, URINE: 80 MG/L
PROTEIN, URINE: NEGATIVE MG/DL
PROTEIN/CREAT RATIO: 0.07
RBC: 3 /HPF (ref 0–5)
SP GRAVITY MISCELLANEOUS: 1.02 (ref 1–1.03)
UROBILINOGEN, URINE: <1.1 EU/DL
WBC: 19 /HPF (ref 0–5)

## 2020-06-11 ENCOUNTER — OFFICE VISIT (OUTPATIENT)
Dept: NEPHROLOGY | Age: 65
End: 2020-06-11
Payer: COMMERCIAL

## 2020-06-11 VITALS
HEART RATE: 86 BPM | WEIGHT: 220 LBS | DIASTOLIC BLOOD PRESSURE: 67 MMHG | SYSTOLIC BLOOD PRESSURE: 116 MMHG | BODY MASS INDEX: 31.5 KG/M2 | OXYGEN SATURATION: 99 % | TEMPERATURE: 98.4 F

## 2020-06-11 PROCEDURE — G8427 DOCREV CUR MEDS BY ELIG CLIN: HCPCS | Performed by: INTERNAL MEDICINE

## 2020-06-11 PROCEDURE — 99213 OFFICE O/P EST LOW 20 MIN: CPT | Performed by: INTERNAL MEDICINE

## 2020-06-11 PROCEDURE — 3017F COLORECTAL CA SCREEN DOC REV: CPT | Performed by: INTERNAL MEDICINE

## 2020-06-11 PROCEDURE — 1036F TOBACCO NON-USER: CPT | Performed by: INTERNAL MEDICINE

## 2020-06-11 PROCEDURE — G8417 CALC BMI ABV UP PARAM F/U: HCPCS | Performed by: INTERNAL MEDICINE

## 2020-06-11 RX ORDER — LEVOTHYROXINE SODIUM 25 MCG
25 TABLET ORAL DAILY
COMMUNITY
Start: 2020-04-01

## 2020-06-11 RX ORDER — EZETIMIBE 10 MG/1
10 TABLET ORAL
COMMUNITY
Start: 2020-04-12

## 2020-06-11 NOTE — PROGRESS NOTES
by mouth daily      Cholecalciferol (VITAMIN D3) 2000 units CAPS Take 1 tablet by mouth daily      naproxen (NAPROSYN) 500 MG tablet Take 1 tablet by mouth 2 times daily as needed  2    levocetirizine (XYZAL) 5 MG tablet Take 5 mg by mouth nightly      glucose blood VI test strips (ONE TOUCH ULTRA TEST) strip Check blood sugar 2 x daily (Dx: E11.9) 200 strip 1    ONETOUCH DELICA LANCETS FINE MISC Check blood sugar 2 x daily (Dx: E11.9) 200 each 1    aspirin 81 MG tablet Take 81 mg by mouth daily.  hydroxychloroquine (PLAQUENIL) 200 MG tablet Take 200 mg by mouth 2 times daily.  quinapril-hydrochlorothiazide (ACCURETIC) 20-12.5 MG per tablet Take 1 tablet by mouth daily. Vitals     /67 (Site: Left Upper Arm, Position: Sitting, Cuff Size: Medium Adult)   Pulse 86   Temp 98.4 °F (36.9 °C) (Temporal)   Wt 220 lb (99.8 kg)   SpO2 99%   BMI 31.50 kg/m²  Wt Readings from Last 3 Encounters:   06/11/20 220 lb (99.8 kg)   06/13/19 217 lb (98.4 kg)   07/23/18 216 lb (98 kg)        Physical Exam     General -- no distress  Lungs -- clear  Heart -- S1, S2 heard, JVD - no  Abdomen - soft, non-tender  Extremities -- no edema  CNS - awake and alert    Labs, Radiology and Tests    Labs -    5/17 5/18 6/19 6/20        Potassium 4.7 4.6 4.4 4.5        BUN 26 15 25 20        Creatinine 1.05 1.18 1.26 1.32        eGFR 67 58 52 49                    UPCR  103 130 <100        UMCR    <30                      Renal Ultrasound Scan -- 6/2019  Rt kidney -9.2 cm, Lt kidney - 8.8. Rt renal cyst  B/L cortical thinning consistant with MRD     Other : old lab data and endocrinology notes have been reviewed and noted that patient's baseline creatinine ranged anywhere between 1.01-1.3, within 2016. Assessment    1. Renal - as per MDRD patient's GFR currently 52 ML/minute. she is stage 3 for now  - CKD possibly from hypertension and diabetes.  Slowly worsening .   - renal US scan shows MRD, no proteinuria noted .  2. Essential hypertension-running well at this time continue current medications. 3. Diabetes mellitus very well controlled. 4. Arthritis she is on Plaquenil advised only take when necessary Naprosyn if required. 5. Trace proteinuria currently on ACE inhibitor probably from diabetes. 6. Follow up in 12 months. 7. Medications reviewed. Tests and orders placed this Encounter     Orders Placed This Encounter   Procedures    Basic Metabolic Panel    Microalbumin / Creatinine Urine Ratio    Protein / creatinine ratio, urine    Urinalysis with Microscopic       Ainsley Altamirano M.D  [facility].

## 2021-05-11 ENCOUNTER — TELEPHONE (OUTPATIENT)
Dept: NEPHROLOGY | Age: 66
End: 2021-05-11

## 2021-05-11 DIAGNOSIS — N18.30 STAGE 3 CHRONIC KIDNEY DISEASE, UNSPECIFIED WHETHER STAGE 3A OR 3B CKD (HCC): Primary | ICD-10-CM

## 2021-05-11 LAB
ANION GAP SERPL CALCULATED.3IONS-SCNC: 12 MMOL/L (ref 5–15)
APPEARANCE: CLEAR
BILIRUBIN: NEGATIVE
BUN BLDV-MCNC: 23 MG/DL (ref 5–27)
CALCIUM SERPL-MCNC: 11 MG/DL (ref 8.5–10.5)
CHLORIDE BLD-SCNC: 103 MMOL/L (ref 98–109)
CO2: 26 MMOL/L (ref 22–32)
COLOR: YELLOW
CREAT SERPL-MCNC: 1.38 MG/DL (ref 0.4–1)
CREATINE, URINE: 206.32 MG/DL
CREATINE, URINE: 206.32 MG/DL
EGFR AFRICAN AMERICAN: 47 ML/MIN/1.73SQ.M
EGFR IF NONAFRICAN AMERICAN: 39 ML/MIN/1.73SQ.M
GLUCOSE BLD-MCNC: NEGATIVE MG/DL
GLUCOSE: 122 MG/DL (ref 65–99)
KETONES, URINE: NEGATIVE MG/DL
LEUKOCYTE ESTERASE, URINE: ABNORMAL
MICROALBUMIN/CREAT 24H UR: 3 MG/DL (ref 0–1.9)
MICROALBUMIN/CREAT UR-RTO: 14.5 MG/G CREAT (ref 0–30)
NITRITE, URINE: NEGATIVE
OCCULT BLOOD,URINE: ABNORMAL
OTHER MICROSCOPIC ELEMENTS: ABNORMAL
PH: 6 (ref 5–8.5)
POTASSIUM SERPL-SCNC: 4.7 MMOL/L (ref 3.5–5)
PROTEIN, URINE: 200 MG/L
PROTEIN, URINE: ABNORMAL MG/DL
PROTEIN/CREAT RATIO: 0.1
RBC: 2 /HPF (ref 0–5)
SODIUM BLD-SCNC: 141 MMOL/L (ref 134–146)
SP GRAVITY MISCELLANEOUS: 1.02 (ref 1–1.03)
UROBILINOGEN, URINE: <1.1 EU/DL
WBC: 24 /HPF (ref 0–5)

## 2021-05-11 NOTE — TELEPHONE ENCOUNTER
----- Message from Estefany Contreras MD sent at 5/11/2021  2:15 PM EDT -----  Can we please add albumin and vitamin d level to the labs from yesterday please

## 2021-05-26 NOTE — TELEPHONE ENCOUNTER
2021    Name: Lucy Morocho : 1941 Sex: male  Age: 70 y.o. Subjective: Follow-up on hypertension and diet-controlled diabetes    Hypertension  Pertinent negatives include no chest pain, headaches, palpitations or shortness of breath. This 70y.o. -year-old male  presents today for evaluation and management of his  chronic medical problems. Current medication list reviewed. The patient is tolerating all medications well without adverse events or known side effects. The patient does understand the risk and benefits of the prescribed medications. Patient suffered another CVA on December 10, 2019. He was at [facility]. Shahrzad Shafer He saw Dr. Rosalino Ingram Patient notified of Aubrie's recommendations. Lab order mailed to the patient. [address] [address] Rikki Danielle DO  5/26/2021

## 2021-06-01 ENCOUNTER — OFFICE VISIT (OUTPATIENT)
Dept: NEPHROLOGY | Age: 66
End: 2021-06-01
Payer: COMMERCIAL

## 2021-06-01 VITALS
BODY MASS INDEX: 32.14 KG/M2 | HEART RATE: 68 BPM | TEMPERATURE: 97.8 F | OXYGEN SATURATION: 100 % | WEIGHT: 217 LBS | DIASTOLIC BLOOD PRESSURE: 76 MMHG | SYSTOLIC BLOOD PRESSURE: 112 MMHG | HEIGHT: 69 IN

## 2021-06-01 DIAGNOSIS — E83.52 HYPERCALCEMIA: ICD-10-CM

## 2021-06-01 DIAGNOSIS — N18.30 STAGE 3 CHRONIC KIDNEY DISEASE, UNSPECIFIED WHETHER STAGE 3A OR 3B CKD (HCC): Primary | ICD-10-CM

## 2021-06-01 DIAGNOSIS — I10 ESSENTIAL HYPERTENSION: ICD-10-CM

## 2021-06-01 PROCEDURE — 3017F COLORECTAL CA SCREEN DOC REV: CPT | Performed by: INTERNAL MEDICINE

## 2021-06-01 PROCEDURE — G8427 DOCREV CUR MEDS BY ELIG CLIN: HCPCS | Performed by: INTERNAL MEDICINE

## 2021-06-01 PROCEDURE — G8417 CALC BMI ABV UP PARAM F/U: HCPCS | Performed by: INTERNAL MEDICINE

## 2021-06-01 PROCEDURE — 99214 OFFICE O/P EST MOD 30 MIN: CPT | Performed by: INTERNAL MEDICINE

## 2021-06-01 PROCEDURE — 1036F TOBACCO NON-USER: CPT | Performed by: INTERNAL MEDICINE

## 2021-06-01 RX ORDER — BACLOFEN 20 MG
TABLET ORAL
COMMUNITY
Start: 2021-03-04

## 2021-06-01 NOTE — PROGRESS NOTES
[facility]        Outpatient Follow-Up note         6/1/2021 9:19 AM    Patient Name:   Cheri Abdul  YOB: 1955  Primary Care Physician:  LEONA Tamayo CNP     Chief Complaint / Reason for follow-up : Follow Up of CKD     Interval History :  Patient seen and examined by me. Feels well. No cp or SOB. No urinary complaints  No hospitalizations and no new meds  Patient did have left shoulder surgery done. She was also started on magnesium no other medication changes     Past History :  Past Medical History:   Diagnosis Date    Anemia     Carpal tunnel syndrome     right    Hyperlipidemia     Hypertension     Osteoarthritis     Thyroid disease     Torn rotator cuff     Right    Type II or unspecified type diabetes mellitus without mention of complication, not stated as uncontrolled      Past Surgical History:   Procedure Laterality Date   [address] SURGERY  2013    CHOLECYSTECTOMY  2009   [facility]  2011    SHOULDER SURGERY Left 2007    VEIN SURGERY  2015    Varicose Vein        Medications :     Outpatient Medications Marked as Taking for the 6/1/21 encounter (Office Visit) with Reuben Lopez MD   Medication Sig Dispense Refill    Magnesium Oxide 500 MG TABS       NONFORMULARY Potassium 99      ezetimibe (ZETIA) 10 MG tablet Take 10 mg by mouth Once in dialysis      SYNTHROID 25 MCG tablet Take 25 mcg by mouth daily      ibuprofen (ADVIL;MOTRIN) 200 MG tablet Take 200 mg by mouth as needed       loratadine (CLARITIN) 10 MG tablet TK 1 T PO QD  3    atorvastatin (LIPITOR) 40 MG tablet TK 1 T PO QD      cyclobenzaprine (FLEXERIL) 10 MG tablet TK 1 T PO TID PRN  1    JANUMET  MG per tablet TAKE 1 TABLET TWICE A DAY WITH MEALS (IS TAKING BOTH JANUMET AND METFORMIN) 180 tablet 0    blood glucose test strips (ONETOUCH VERIO) strip 1 each by In Vitro route daily As needed.  100 each 3    ONE TOUCH LANCETS MISC 1 each by Does not apply route daily 100 each 3    omeprazole (PRILOSEC) 40 MG delayed release capsule Take 40 mg by mouth daily      Cholecalciferol (VITAMIN D3) 2000 units CAPS Take 1 tablet by mouth daily      naproxen (NAPROSYN) 500 MG tablet Take 1 tablet by mouth 2 times daily as needed  2    levocetirizine (XYZAL) 5 MG tablet Take 5 mg by mouth nightly      glucose blood VI test strips (ONE TOUCH ULTRA TEST) strip Check blood sugar 2 x daily (Dx: E11.9) 200 strip 1    ONETOUCH DELICA LANCETS FINE MISC Check blood sugar 2 x daily (Dx: E11.9) 200 each 1    aspirin 81 MG tablet Take 81 mg by mouth daily.  hydroxychloroquine (PLAQUENIL) 200 MG tablet Take 200 mg by mouth 2 times daily.  quinapril-hydrochlorothiazide (ACCURETIC) 20-12.5 MG per tablet Take 1 tablet by mouth daily. Vitals     /76 (Site: Right Upper Arm, Position: Sitting, Cuff Size: Large Adult)   Pulse 68   Temp 97.8 °F (36.6 °C) (Temporal)   Ht 5' 9\" (1.753 m)   Wt 217 lb (98.4 kg)   SpO2 100%   BMI 32.05 kg/m²  Wt Readings from Last 3 Encounters:   06/01/21 217 lb (98.4 kg)   06/11/20 220 lb (99.8 kg)   06/13/19 217 lb (98.4 kg)        Physical Exam     General -- no distress  Lungs -- clear  Heart -- S1, S2 heard, JVD - no  Abdomen - soft, non-tender  Extremities -- no edema  CNS - awake and alert    Labs, Radiology and Tests    Labs -    5/17 5/18 6/19 6/20 5/21       Potassium 4.7 4.6 4.4 4.5 4.7       BUN 26 15 25 20 23       Creatinine 1.05 1.18 1.26 1.32 1.38       eGFR 67 58 52 41 39                   UPCR  103 130 <100 100       UMCR    <30 15                     Renal Ultrasound Scan -- 6/2019  Rt kidney -9.2 cm, Lt kidney - 8.8. Rt renal cyst  B/L cortical thinning consistant with MRD     Other : old lab data and endocrinology notes have been reviewed and noted that patient's baseline creatinine ranged anywhere between 1.01-1.3, within 2016. Assessment    1.  Renal - as per MDRD patient's GFR currently 39

## 2021-11-22 LAB
ALBUMIN SERPL-MCNC: 3.9 G/DL (ref 3.2–5.3)
ALK PHOSPHATASE: 120 U/L (ref 39–130)
ALT SERPL-CCNC: 46 U/L (ref 0–31)
ANION GAP SERPL CALCULATED.3IONS-SCNC: 11 MMOL/L (ref 5–15)
AST SERPL-CCNC: 24 U/L (ref 0–41)
BILIRUB SERPL-MCNC: 0.5 MG/DL (ref 0.3–1.2)
BUN BLDV-MCNC: 22 MG/DL (ref 5–27)
CALCIUM SERPL-MCNC: 10.2 MG/DL (ref 8.5–10.5)
CHLORIDE BLD-SCNC: 105 MMOL/L (ref 98–109)
CO2: 25 MMOL/L (ref 22–32)
CREAT SERPL-MCNC: 1.34 MG/DL (ref 0.4–1)
EGFR AFRICAN AMERICAN: 48 ML/MIN/1.73SQ.M
EGFR IF NONAFRICAN AMERICAN: 40 ML/MIN/1.73SQ.M
GLUCOSE: 109 MG/DL (ref 65–99)
MAGNESIUM: 1.7 MG/DL (ref 1.8–2.6)
PARATHYROID HORMONE INTACT: 85 PG/ML (ref 12–88)
PHOSPHORUS: 3.4 MG/DL (ref 2.4–4.9)
POTASSIUM SERPL-SCNC: 4.2 MMOL/L (ref 3.5–5)
SODIUM BLD-SCNC: 141 MMOL/L (ref 134–146)
TOTAL PROTEIN: 7.4 G/DL (ref 6–8)
VITAMIN D 25-HYDROXY: 43.6 NG/ML (ref 30–100)

## 2021-12-02 ENCOUNTER — OFFICE VISIT (OUTPATIENT)
Dept: NEPHROLOGY | Age: 66
End: 2021-12-02
Payer: COMMERCIAL

## 2021-12-02 VITALS
WEIGHT: 213 LBS | DIASTOLIC BLOOD PRESSURE: 76 MMHG | SYSTOLIC BLOOD PRESSURE: 131 MMHG | HEART RATE: 87 BPM | OXYGEN SATURATION: 100 % | BODY MASS INDEX: 31.45 KG/M2

## 2021-12-02 DIAGNOSIS — N18.30 STAGE 3 CHRONIC KIDNEY DISEASE, UNSPECIFIED WHETHER STAGE 3A OR 3B CKD (HCC): Primary | ICD-10-CM

## 2021-12-02 DIAGNOSIS — E83.52 HYPERCALCEMIA: ICD-10-CM

## 2021-12-02 DIAGNOSIS — I10 ESSENTIAL HYPERTENSION: ICD-10-CM

## 2021-12-02 PROCEDURE — G8427 DOCREV CUR MEDS BY ELIG CLIN: HCPCS | Performed by: INTERNAL MEDICINE

## 2021-12-02 PROCEDURE — 99213 OFFICE O/P EST LOW 20 MIN: CPT | Performed by: INTERNAL MEDICINE

## 2021-12-02 PROCEDURE — 1036F TOBACCO NON-USER: CPT | Performed by: INTERNAL MEDICINE

## 2021-12-02 PROCEDURE — G8484 FLU IMMUNIZE NO ADMIN: HCPCS | Performed by: INTERNAL MEDICINE

## 2021-12-02 PROCEDURE — G8417 CALC BMI ABV UP PARAM F/U: HCPCS | Performed by: INTERNAL MEDICINE

## 2021-12-02 PROCEDURE — 3017F COLORECTAL CA SCREEN DOC REV: CPT | Performed by: INTERNAL MEDICINE

## 2021-12-02 RX ORDER — FERROUS SULFATE 325(65) MG
325 TABLET ORAL EVERY OTHER DAY
COMMUNITY

## 2021-12-02 NOTE — PROGRESS NOTES
[facility]        Outpatient Follow-Up note         12/2/2021 9:32 AM    Patient Name:   Melody Feliz  YOB: 1955  Primary Care Physician:  LEONA Morris CNP     Chief Complaint / Reason for follow-up : Follow Up of CKD     Interval History :  Patient seen and examined by me. Feels well. No cp or SOB. No urinary complaints  No hospitalizations and no new meds  Patient did have left shoulder surgery done.   She was also started on magnesium no other medication changes     Past History :  Past Medical History:   Diagnosis Date    Anemia     Carpal tunnel syndrome     right    Hyperlipidemia     Hypertension     Osteoarthritis     Thyroid disease     Torn rotator cuff     Right    Type II or unspecified type diabetes mellitus without mention of complication, not stated as uncontrolled      Past Surgical History:   Procedure Laterality Date   [address] [facility]  2013    [facility]LE[facility]OMY  2009   [facility]  2011    SHOULDER SURGERY Left 2007    VEIN SURGERY  2015    Varicose Vein        Medications :     Outpatient Medications Marked as Taking for the 12/2/21 encounter (Office Visit) with Francisco Parsons MD   Medication Sig Dispense Refill    ferrous sulfate (IRON 325) 325 (65 Fe) MG tablet Take 325 mg by mouth every other day MWF      metFORMIN (GLUCOPHAGE) 500 MG tablet Take 500 mg by mouth daily (with breakfast)       Magnesium Oxide 500 MG TABS       NONFORMULARY Potassium 99      ezetimibe (ZETIA) 10 MG tablet Take 10 mg by mouth Once in dialysis      SYNTHROID 25 MCG tablet Take 25 mcg by mouth daily      ibuprofen (ADVIL;MOTRIN) 200 MG tablet Take 200 mg by mouth as needed       loratadine (CLARITIN) 10 MG tablet TK 1 T PO QD  3    atorvastatin (LIPITOR) 40 MG tablet TK 1 T PO QD      cyclobenzaprine (FLEXERIL) 10 MG tablet TK 1 T PO TID PRN  1    JANUMET  MG per tablet TAKE 1 TABLET TWICE A DAY WITH MEALS (IS TAKING BOTH JANUMET AND METFORMIN) 180 tablet 0    blood glucose test strips (ONETOUCH VERIO) strip 1 each by In Vitro route daily As needed. 100 each 3    ONE TOUCH LANCETS MISC 1 each by Does not apply route daily 100 each 3    omeprazole (PRILOSEC) 40 MG delayed release capsule Take 40 mg by mouth daily      Cholecalciferol (VITAMIN D3) 2000 units CAPS Take 1 tablet by mouth daily      naproxen (NAPROSYN) 500 MG tablet Take 1 tablet by mouth 2 times daily as needed  2    levocetirizine (XYZAL) 5 MG tablet Take 5 mg by mouth nightly      glucose blood VI test strips (ONE TOUCH ULTRA TEST) strip Check blood sugar 2 x daily (Dx: E11.9) 200 strip 1    ONETOUCH DELICA LANCETS FINE MISC Check blood sugar 2 x daily (Dx: E11.9) 200 each 1    aspirin 81 MG tablet Take 81 mg by mouth daily.  hydroxychloroquine (PLAQUENIL) 200 MG tablet Take 200 mg by mouth 2 times daily.  quinapril-hydrochlorothiazide (ACCURETIC) 20-12.5 MG per tablet Take 1 tablet by mouth daily. Vitals     /76 (Site: Left Upper Arm, Position: Sitting, Cuff Size: Medium Adult)   Pulse 87   Wt 213 lb (96.6 kg)   SpO2 100%   BMI 31.45 kg/m²  Wt Readings from Last 3 Encounters:   12/02/21 213 lb (96.6 kg)   06/01/21 217 lb (98.4 kg)   06/11/20 220 lb (99.8 kg)        Physical Exam     General -- no distress  Lungs -- clear  Heart -- S1, S2 heard, JVD - no  Abdomen - soft, non-tender  Extremities -- no edema  CNS - awake and alert    Labs, Radiology and Tests    Labs -    5/17 5/18 6/19 6/20 5/21 11/21      Potassium 4.7 4.6 4.4 4.5 4.7 4.2      BUN 26 15 25 20 23 22      Creatinine 1.05 1.18 1.26 1.32 1.38 1.34      eGFR 67 58 52 41 39 40                  UPCR  103 130 <100 100       UMCR    <30 15                     11/21 - pth 85, vitamin d - 43  Renal Ultrasound Scan -- 6/2019  Rt kidney -9.2 cm, Lt kidney - 8.8.   Rt renal cyst  B/L cortical thinning consistant with MRD     Other : old lab data and endocrinology notes have been reviewed and noted that patient's baseline creatinine ranged anywhere between 1.01-1.3, within 2016. Assessment    1. Renal - as per MDRD patient's GFR currently 40 ML/minute. she is stage 3 for now  - CKD possibly from hypertension and diabetes. Slowly worsening .   - renal US scan shows MRD, no proteinuria noted . 2. Essential hypertension-running well at this time continue current medications. on lisinopril - HCTZ  3. Diabetes mellitus  well controlled. 4. Arthritis she is on Plaquenil advised only take when necessary Naprosyn if required. 5. Trace proteinuria currently on ACE inhibitor probably from diabetes. 6. Hypercalcemia exact etiology unclear however calcium is stable PTH is slightly elevated to the degree of hypercalcemia however at this time we will follow advised to stay off of the calcium and vitamin D supplements for now  7. Follow up in 6 months. 8. Medications reviewed. Tests and orders placed this Encounter     Orders Placed This Encounter   Procedures   Elisabeth Vidal M.D  [facility].

## 2022-01-01 ENCOUNTER — HOSPITAL ENCOUNTER (EMERGENCY)
Age: 67
Discharge: HOME | End: 2022-01-01
Payer: COMMERCIAL

## 2022-01-01 VITALS
DIASTOLIC BLOOD PRESSURE: 58 MMHG | HEART RATE: 72 BPM | SYSTOLIC BLOOD PRESSURE: 129 MMHG | OXYGEN SATURATION: 99 % | RESPIRATION RATE: 18 BRPM | TEMPERATURE: 96.8 F

## 2022-01-01 DIAGNOSIS — U07.1 COVID-19 VIRUS INFECTION: Primary | ICD-10-CM

## 2022-01-01 LAB — SARS-COV-2, NAA: DETECTED

## 2022-01-01 PROCEDURE — 99213 OFFICE O/P EST LOW 20 MIN: CPT | Performed by: EMERGENCY MEDICINE

## 2022-01-01 PROCEDURE — 99203 OFFICE O/P NEW LOW 30 MIN: CPT

## 2022-01-01 PROCEDURE — 87635 SARS-COV-2 COVID-19 AMP PRB: CPT

## 2022-01-01 ASSESSMENT — ENCOUNTER SYMPTOMS
COUGH: 1
SORE THROAT: 0
RHINORRHEA: 1
SINUS PRESSURE: 1
SHORTNESS OF BREATH: 0
COLOR CHANGE: 0
ABDOMINAL PAIN: 0
SINUS PAIN: 1
NAUSEA: 0
DIARRHEA: 0

## 2022-01-01 NOTE — LETTER
[address] [facility]  [address] [place] [address]  Phone: [phone]               January 3, 2022    Patient: Kishore Robertson   YOB: 1955   Date of Visit: 1/1/2022       To Whom It May Concern:    Dong Alejandre was seen and treated in our emergency department on 1/1/2022. She may return to work on 01/06/2022 .       Sincerely,       Genaro Alvarez RN         Signature:__________________________________

## 2022-01-01 NOTE — ED PROVIDER NOTES
[facility]  Urgent Care Encounter       CHIEF COMPLAINT       Chief Complaint   Patient presents with    Covid Testing     daughter tested positive- cough sinus drainage        Nurses Notes reviewed and I agree except as noted in the HPI. HISTORY OF PRESENT ILLNESS   Linda Britton is a 61 y.o. female who presents for concerns for COVID-19. Patient has sinus congestion and pressure. No significant cough or shortness of breath. No chest pain. No fevers. Patient states that her daughter has tested positive for COVID-19. She was around her daughter during the holidays. Symptoms started after being around her daughter. She has had the Covid vaccines and has had the Covid booster. HPI    REVIEW OF SYSTEMS     Review of Systems   Constitutional: Negative for diaphoresis, fatigue and fever. HENT: Positive for congestion, rhinorrhea, sinus pressure and sinus pain. Negative for sore throat. Respiratory: Positive for cough. Negative for shortness of breath. Cardiovascular: Negative for chest pain and leg swelling. Gastrointestinal: Negative for abdominal pain, diarrhea and nausea. Skin: Negative for color change. Neurological: Positive for headaches. Psychiatric/Behavioral: Negative for behavioral problems. PAST MEDICAL HISTORY         Diagnosis Date    Anemia     Carpal tunnel syndrome     right    Hyperlipidemia     Hypertension     Osteoarthritis     Thyroid disease     Torn rotator cuff     Right    Type II or unspecified type diabetes mellitus without mention of complication, not stated as uncontrolled        SURGICALHISTORY     Patient  has a past surgical history that includes shoulder surgery (Left, 2007); Cholecystectomy (2009); Nerve Surgery (2011); Cervical disc surgery (2013); and Vein Surgery (2015).     CURRENT MEDICATIONS       Discharge Medication List as of 1/1/2022  6:32 PM      CONTINUE these medications which have NOT CHANGED    Details ferrous sulfate (IRON 325) 325 (65 Fe) MG tablet Take 325 mg by mouth every other day MWFHistorical Med      metFORMIN (GLUCOPHAGE) 500 MG tablet Take 500 mg by mouth daily (with breakfast) Historical Med      Magnesium Oxide 500 MG TABS Historical Med      NONFORMULARY Potassium 99Historical Med      ezetimibe (ZETIA) 10 MG tablet Take 10 mg by mouth Once in dialysisHistorical Med      SYNTHROID 25 MCG tablet Take 25 mcg by mouth daily, DAWHistorical Med      ibuprofen (ADVIL;MOTRIN) 200 MG tablet Take 200 mg by mouth as needed Historical Med      loratadine (CLARITIN) 10 MG tablet TK 1 T PO QD, R-3Historical Med      atorvastatin (LIPITOR) 40 MG tablet TK 1 T PO QDHistorical Med      cyclobenzaprine (FLEXERIL) 10 MG tablet TK 1 T PO TID PRN, R-1Historical Med      JANUMET  MG per tablet TAKE 1 TABLET TWICE A DAY WITH MEALS (IS TAKING BOTH JANUMET AND METFORMIN), Disp-180 tablet, R-0Normal      !! blood glucose test strips (ONETOUCH VERIO) strip DAILY Starting Mon 7/23/2018, Disp-100 each, R-3, NormalAs needed. !! ONE TOUCH LANCETS MISC DAILY Starting Mon 7/23/2018, Disp-100 each, R-3, Normal      omeprazole (PRILOSEC) 40 MG delayed release capsule Take 40 mg by mouth dailyHistorical Med      Cholecalciferol (VITAMIN D3) 2000 units CAPS Take 1 tablet by mouth dailyHistorical Med      naproxen (NAPROSYN) 500 MG tablet Take 1 tablet by mouth 2 times daily as needed, R-2      levocetirizine (XYZAL) 5 MG tablet Take 5 mg by mouth nightly      !! glucose blood VI test strips (ONE TOUCH ULTRA TEST) strip Disp-200 strip, R-1, NormalCheck blood sugar 2 x daily (Dx: E11.9)      !! ONETOUCH DELICA LANCETS FINE MISC Disp-200 each, R-1, NormalCheck blood sugar 2 x daily (Dx: E11.9)      aspirin 81 MG tablet Take 81 mg by mouth daily. hydroxychloroquine (PLAQUENIL) 200 MG tablet Take 200 mg by mouth 2 times daily.       quinapril-hydrochlorothiazide (ACCURETIC) 20-12.5 MG per tablet Take 1 tablet by mouth daily.       !! - Potential duplicate medications found. Please discuss with provider. ALLERGIES     Patient is has No Known Allergies. Patients   There is no immunization history on file for this patient. FAMILY HISTORY     Patient's family history includes Arthritis in her mother; Diabetes in her brother, mother, and sister; Heart Defect in her sister; High Blood Pressure in her mother, sister, and sister; Other in her brother and mother. SOCIAL HISTORY     Patient  reports that she has never smoked. She has never used smokeless tobacco. She reports that she does not drink alcohol and does not use drugs. PHYSICAL EXAM     ED TRIAGE VITALS  BP: (!) 129/58, Temp: 96.8 °F (36 °C), Pulse: 72, Resp: 18, SpO2: 99 %,Estimated body mass index is 31.45 kg/m² as calculated from the following:    Height as of 6/1/21: 5' 9\" (1.753 m). Weight as of 12/2/21: 213 lb (96.6 kg). ,No LMP recorded. Patient is postmenopausal.    Physical Exam  HENT:      Right Ear: Tympanic membrane normal.      Left Ear: Tympanic membrane normal.      Nose: Congestion and rhinorrhea present. Mouth/Throat:      Mouth: Mucous membranes are moist.      Pharynx: Oropharynx is clear. No oropharyngeal exudate. Cardiovascular:      Rate and Rhythm: Normal rate and regular rhythm. Pulses: Normal pulses. Heart sounds: Normal heart sounds. Pulmonary:      Effort: Pulmonary effort is normal. No respiratory distress. Breath sounds: Normal breath sounds. No wheezing or rhonchi. Skin:     General: Skin is warm and dry. Capillary Refill: Capillary refill takes less than 2 seconds. Neurological:      General: No focal deficit present. Mental Status: She is alert.    Psychiatric:         Mood and Affect: Mood normal.         Behavior: Behavior normal.         DIAGNOSTIC RESULTS     Labs:  Results for orders placed or performed during the hospital encounter of 01/01/22   COVID-19, Rapid   Result Value Ref Range    SARS-CoV-2, LEANNE DETECTED (AA) NOT DETECTED       IMAGING:    No orders to display         EKG:      URGENT CARE COURSE:     Vitals:    01/01/22 1739   BP: (!) 129/58   Pulse: 72   Resp: 18   Temp: 96.8 °F (36 °C)   TempSrc: Temporal   SpO2: 99%       Medications - No data to display         PROCEDURES:  None    FINAL IMPRESSION      1. COVID-19 virus infection          DISPOSITION/ PLAN     Presents for COVID-19 testing. Patient is positive. Patient has minimal symptoms. Patient be discharged and advised to continue drinking plenty fluids. Tylenol. Afrin.  Return for new or worsening symptoms      PATIENT REFERRED TO:  LEONA Langley CNP  [address] [place] [address] / [facility]      DISCHARGE MEDICATIONS:  Discharge Medication List as of 1/1/2022  6:32 PM          Discharge Medication List as of 1/1/2022  6:32 PM          Discharge Medication List as of 1/1/2022  6:32 PM          LEONA Rosales CNP    (Please note that portions of this note were completed with a voice recognition program. Efforts were made to edit the dictations but occasionally words are mis-transcribed.)          LEONA Rosales CNP  01/01/22 [id number] 28-Jul-2018 16:23

## 2022-01-03 ENCOUNTER — CARE COORDINATION (OUTPATIENT)
Dept: CARE COORDINATION | Age: 67
End: 2022-01-03

## 2022-01-03 NOTE — CARE COORDINATION
Patient contacted regarding COVID-19 diagnosis. Discussed COVID-19 related testing which was available at this time. Test results were positive. Patient informed of results, if available? Yes. Ambulatory Care Manager contacted the patient by telephone to perform post discharge assessment. Call within 2 business days of discharge: Yes. Verified name and  with patient as identifiers. Provided introduction to self, and explanation of the CTN/ACM role, and reason for call due to risk factors for infection and/or exposure to COVID-19. Symptoms reviewed with patient who verbalized the following symptoms: cough. no new s/s. Due to no new or worsening symptoms encounter was not routed to provider for escalation. Discussed follow-up appointments. If no appointment was previously scheduled, appointment scheduling offered: No.  [facility] follow up appointment(s):   Future Appointments   Date Time Provider Hina Doty   2022  9:20 AM [facility] [place]     Non-[facility] follow up appointment(s): advised to f/u with Cristina Fu CNP    Non-face-to-face services provided:  Obtained and reviewed discharge summary and/or continuity of care documents     Advance Care Planning:   Does patient have an Advance Directive:  not on file. Educated patient about risk for severe COVID-19 due to risk factors according to CDC guidelines. ACM reviewed discharge instructions, medical action plan and red flag symptoms with the patient who verbalized understanding. Discussed COVID vaccination status: No. Education provided on COVID-19 vaccination as appropriate. Discussed exposure protocols and quarantine with CDC Guidelines. Patient was given an opportunity to verbalize any questions and concerns and agrees to contact ACM or health care provider for questions related to their healthcare.     Reviewed and educated patient on any new and changed medications related to discharge diagnosis     Was patient discharged with a pulse oximeter? No Discussed and confirmed pulse oximeter discharge instructions and when to notify provider or seek emergency care. ACM provided contact information. Plan for follow-up call in 5-7 days based on severity of symptoms and risk factors.   Reviewed COVID Zones

## 2022-01-10 ENCOUNTER — CARE COORDINATION (OUTPATIENT)
Dept: CARE COORDINATION | Age: 67
End: 2022-01-10

## 2022-01-10 NOTE — CARE COORDINATION
You Patient resolved from the Care Transitions episode on 1/10/22  Discussed COVID-19 related testing which was available at this time. Test results were positive. Patient informed of results, if available? n/a    Patient/family has been provided the following resources and education related to COVID-19:                         Signs, symptoms and red flags related to COVID-19            [facility] exposure and quarantine guidelines            [facility] exposure contact - [phone]            Contact for their local Department of Health                 Patient currently reports that the following symptoms have improved:  fatigue and no new/worsening symptoms. Occasional cough     No further outreach scheduled with this CTN/ACM. Episode of Care resolved. Patient has this CTN/ACM contact information if future needs arise.

## 2022-05-07 ENCOUNTER — NURSE ONLY (OUTPATIENT)
Dept: LAB | Age: 67
End: 2022-05-07

## 2022-05-07 DIAGNOSIS — N18.30 STAGE 3 CHRONIC KIDNEY DISEASE, UNSPECIFIED WHETHER STAGE 3A OR 3B CKD (HCC): ICD-10-CM

## 2022-05-07 DIAGNOSIS — E83.52 HYPERCALCEMIA: ICD-10-CM

## 2022-05-07 DIAGNOSIS — I10 ESSENTIAL HYPERTENSION: ICD-10-CM

## 2022-05-07 LAB
ALBUMIN SERPL-MCNC: 4.1 G/DL (ref 3.5–5.1)
ALP BLD-CCNC: 149 U/L (ref 38–126)
ALT SERPL-CCNC: 43 U/L (ref 11–66)
ANION GAP SERPL CALCULATED.3IONS-SCNC: 12 MEQ/L (ref 8–16)
AST SERPL-CCNC: 23 U/L (ref 5–40)
BILIRUB SERPL-MCNC: 0.5 MG/DL (ref 0.3–1.2)
BUN BLDV-MCNC: 21 MG/DL (ref 7–22)
CALCIUM SERPL-MCNC: 10.3 MG/DL (ref 8.5–10.5)
CHLORIDE BLD-SCNC: 103 MEQ/L (ref 98–111)
CO2: 24 MEQ/L (ref 23–33)
CREAT SERPL-MCNC: 1.2 MG/DL (ref 0.4–1.2)
GLUCOSE BLD-MCNC: 135 MG/DL (ref 70–108)
POTASSIUM SERPL-SCNC: 4.4 MEQ/L (ref 3.5–5.2)
SODIUM BLD-SCNC: 139 MEQ/L (ref 135–145)
TOTAL PROTEIN: 6.6 G/DL (ref 6.1–8)

## 2022-06-02 ENCOUNTER — OFFICE VISIT (OUTPATIENT)
Dept: NEPHROLOGY | Age: 67
End: 2022-06-02

## 2022-06-02 VITALS
BODY MASS INDEX: 31.6 KG/M2 | HEART RATE: 90 BPM | DIASTOLIC BLOOD PRESSURE: 78 MMHG | SYSTOLIC BLOOD PRESSURE: 118 MMHG | TEMPERATURE: 97.6 F | OXYGEN SATURATION: 99 % | WEIGHT: 214 LBS

## 2022-06-02 DIAGNOSIS — N18.30 STAGE 3 CHRONIC KIDNEY DISEASE, UNSPECIFIED WHETHER STAGE 3A OR 3B CKD (HCC): Primary | ICD-10-CM

## 2022-06-02 DIAGNOSIS — I10 ESSENTIAL HYPERTENSION: ICD-10-CM

## 2022-06-02 DIAGNOSIS — E83.52 HYPERCALCEMIA: ICD-10-CM

## 2022-06-02 LAB — GFR SERPL CREATININE-BSD FRML MDRD: 45 ML/MIN/1.73M2

## 2022-06-02 RX ORDER — ALPRAZOLAM 0.25 MG/1
TABLET ORAL
COMMUNITY
Start: 2021-12-29

## 2022-06-02 NOTE — PROGRESS NOTES
[facility]        Outpatient Follow-Up note         6/2/2022 10:01 AM    Patient Name:   Salas Wilder  YOB: 1955  Primary Care Physician:  LEONA Lorenz CNP     Chief Complaint / Reason for follow-up : Follow Up of CKD     Interval History :  Patient seen and examined by me. Feels well. No cp or SOB. No urinary complaints  Had covid -19     Past History :  Past Medical History:   Diagnosis Date    Anemia     Carpal tunnel syndrome     right    Hyperlipidemia     Hypertension     Osteoarthritis     Thyroid disease     Torn rotator cuff     Right    Type II or unspecified type diabetes mellitus without mention of complication, not stated as uncontrolled      Past Surgical History:   Procedure Laterality Date    CERVICAL [address] SURGERY  2013    CHOLECYSTECTOMY  2009   [facility]  2011    SHOULDER SURGERY Left 2007    VEIN SURGERY  2015    Varicose Vein        Medications :     Outpatient Medications Marked as Taking for the 6/2/22 encounter (Office Visit) with Ernesto Edwards MD   Medication Sig Dispense Refill    ALPRAZolam (XANAX) 0.25 MG tablet Alprazolam Active 0.25 MG PO As needed December 29th, 2021 2:21pm HAS NOT TAKEN YET      ferrous sulfate (IRON 325) 325 (65 Fe) MG tablet Take 325 mg by mouth every other day MWF      metFORMIN (GLUCOPHAGE) 500 MG tablet Take 500 mg by mouth daily (with breakfast)       Magnesium Oxide 500 MG TABS       NONFORMULARY Potassium 99      ezetimibe (ZETIA) 10 MG tablet Take 10 mg by mouth Once in dialysis      SYNTHROID 25 MCG tablet Take 25 mcg by mouth daily      atorvastatin (LIPITOR) 40 MG tablet TK 1 T PO QD      cyclobenzaprine (FLEXERIL) 10 MG tablet TK 1 T PO TID PRN  1    JANUMET  MG per tablet TAKE 1 TABLET TWICE A DAY WITH MEALS (IS TAKING BOTH JANUMET AND METFORMIN) 180 tablet 0    blood glucose test strips (ONETOUCH VERIO) strip 1 each by In Vitro route daily As needed.  100 each 3    ONE TOUCH LANCETS MISC 1 each by Does not apply route daily 100 each 3    omeprazole (PRILOSEC) 40 MG delayed release capsule Take 40 mg by mouth daily      Cholecalciferol (VITAMIN D3) 2000 units CAPS Take 1 tablet by mouth daily      levocetirizine (XYZAL) 5 MG tablet Take 5 mg by mouth nightly      glucose blood VI test strips (ONE TOUCH ULTRA TEST) strip Check blood sugar 2 x daily (Dx: E11.9) 200 strip 1    ONETOUCH DELICA LANCETS FINE MISC Check blood sugar 2 x daily (Dx: E11.9) 200 each 1    aspirin 81 MG tablet Take 81 mg by mouth daily.  hydroxychloroquine (PLAQUENIL) 200 MG tablet Take 200 mg by mouth 2 times daily.  quinapril-hydrochlorothiazide (ACCURETIC) 20-12.5 MG per tablet Take 1 tablet by mouth daily. Vitals     /78 (Site: Left Upper Arm, Position: Sitting, Cuff Size: Medium Adult)   Pulse 90   Temp 97.6 °F (36.4 °C) (Temporal)   Wt 214 lb (97.1 kg)   SpO2 99%   BMI 31.60 kg/m²  Wt Readings from Last 3 Encounters:   06/02/22 214 lb (97.1 kg)   12/02/21 213 lb (96.6 kg)   06/01/21 217 lb (98.4 kg)        Physical Exam     General -- no distress  Lungs -- clear  Heart -- S1, S2 heard, JVD - no  Abdomen - soft, non-tender  Extremities -- no edema  CNS - awake and alert    Labs, Radiology and Tests    Labs -    5/17 5/18 6/19 6/20 5/21 11/21 5/22     Potassium 4.7 4.6 4.4 4.5 4.7 4.2 4.4     BUN 26 15 25 20 23 22 21     Creatinine 1.05 1.18 1.26 1.32 1.38 1.34 1.2     eGFR 67 58 52 41 39 40 45                 UPCR  103 130 <100 100       UMCR    <30 15                     11/21 - pth 85, vitamin d - 43  Renal Ultrasound Scan -- 6/2019  Rt kidney -9.2 cm, Lt kidney - 8.8. Rt renal cyst  B/L cortical thinning consistant with MRD     Other : old lab data and endocrinology notes have been reviewed and noted that patient's baseline creatinine ranged anywhere between 1.01-1.3, within 2016. Assessment    1.  Renal - as per MDRD patient's GFR currently 45 ML/minute. she is stage 3 for now  - CKD possibly from hypertension and diabetes. - renal US scan shows MRD, no proteinuria noted . 2. Essential hypertension-running well at this time continue current medications  3. Diabetes mellitus  well controlled. , 6.7   4. Arthritis she is on Plaquenil advised only take when necessary Naprosyn if required. 5. Trace proteinuria currently on ACE inhibitor probably from diabetes. 6. Hypercalcemia exact etiology unclear however calcium is stable PTH is slightly elevated to the degree of hypercalcemia however at this time we will follow advised to stay off of the calcium and vitamin D supplements for now  7. Follow up in 6 months. 8. Medications reviewed. Tests and orders placed this Encounter     Orders Placed This Encounter   Procedures    Comprehensive Metabolic Panel    Vitamin D 25 Hydroxy    Urinalysis with Microscopic    Creatinine, Random Urine    MICROALBUMIN, RANDOM URINE (W/O CREATININE)    Protein, urine, random    PTH, Intact    Phosphorus       Jt Klein M.D  [facility].

## 2022-06-15 LAB
CREATININE, URINE: 71.3
MICROALBUMIN/CREAT 24H UR: 23.8 MG/G{CREAT}
MICROALBUMIN/CREAT UR-RTO: 33.4

## 2022-10-01 LAB
A/G RATIO: NORMAL
ALBUMIN SERPL-MCNC: 4.2 G/DL
ALP BLD-CCNC: 146 U/L
ALT SERPL-CCNC: 16 U/L
AST SERPL-CCNC: 18 U/L
BASOPHILS ABSOLUTE: ABNORMAL
BASOPHILS RELATIVE PERCENT: ABNORMAL
BILIRUB SERPL-MCNC: 0.6 MG/DL (ref 0.1–1.4)
BILIRUBIN DIRECT: 0.2 MG/DL
BILIRUBIN, INDIRECT: NORMAL
BUN BLDV-MCNC: 16 MG/DL
CALCIUM SERPL-MCNC: 9.8 MG/DL
CHLORIDE BLD-SCNC: 104 MMOL/L
CHOLESTEROL, FASTING: 154
CO2: 24 MMOL/L
CREAT SERPL-MCNC: 1.41 MG/DL
EOSINOPHILS ABSOLUTE: ABNORMAL
EOSINOPHILS RELATIVE PERCENT: ABNORMAL
FERRITIN: 237 NG/ML (ref 9–150)
GFR CALCULATED: 42
GLOBULIN: NORMAL
GLUCOSE BLD-MCNC: 112 MG/DL
HCT VFR BLD CALC: 31.7 % (ref 36–46)
HDLC SERPL-MCNC: 57 MG/DL (ref 35–70)
HEMOGLOBIN: 9.7 G/DL (ref 12–16)
IRON SATURATION: 17
IRON: 42
LDL CHOLESTEROL CALCULATED: 74 MG/DL (ref 0–160)
LYMPHOCYTES ABSOLUTE: ABNORMAL
LYMPHOCYTES RELATIVE PERCENT: ABNORMAL
MAGNESIUM: 2 MG/DL
MCH RBC QN AUTO: ABNORMAL PG
MCHC RBC AUTO-ENTMCNC: ABNORMAL G/DL
MCV RBC AUTO: ABNORMAL FL
MONOCYTES ABSOLUTE: ABNORMAL
MONOCYTES RELATIVE PERCENT: ABNORMAL
NEUTROPHILS ABSOLUTE: ABNORMAL
NEUTROPHILS RELATIVE PERCENT: ABNORMAL
PDW BLD-RTO: ABNORMAL %
PLATELET # BLD: 355 K/ΜL
PMV BLD AUTO: ABNORMAL FL
POTASSIUM SERPL-SCNC: 4.2 MMOL/L
PROTEIN TOTAL: 6.8 G/DL
RBC # BLD: 4.54 10^6/ΜL
SODIUM BLD-SCNC: 141 MMOL/L
TOTAL IRON BINDING CAPACITY: 242
TRIGLYCERIDE, FASTING: 114
TSH SERPL DL<=0.05 MIU/L-ACNC: 3.38 UIU/ML
VITAMIN B-12: 280
VITAMIN D 25-HYDROXY: 37
VITAMIN D2, 25 HYDROXY: NORMAL
VITAMIN D3,25 HYDROXY: NORMAL
WBC # BLD: 8.2 10^3/ML

## 2022-12-02 LAB
ALBUMIN SERPL-MCNC: 4.3 G/DL (ref 3.5–5.2)
ALK PHOSPHATASE: 137 U/L (ref 40–140)
ALT SERPL-CCNC: 27 U/L (ref 5–40)
ANION GAP SERPL CALCULATED.3IONS-SCNC: 13 MEQ/L (ref 7–16)
APPEARANCE: CLEAR
AST SERPL-CCNC: 23 U/L (ref 9–40)
BACTERIA: ABNORMAL PER HPF
BILIRUB SERPL-MCNC: 0.8 MG/DL
BILIRUBIN: NEGATIVE
BUN BLDV-MCNC: 15 MG/DL (ref 8–23)
CALCIUM SERPL-MCNC: 10.1 MG/DL (ref 8.5–10.5)
CHLORIDE BLD-SCNC: 101 MEQ/L (ref 95–107)
CO2: 24 MEQ/L (ref 19–31)
COLOR: YELLOW
CREAT SERPL-MCNC: 1.32 MG/DL (ref 0.6–1.3)
CREATINE, URINE: 218.4 MG/DL
EGFR IF NONAFRICAN AMERICAN: 45 ML/MIN/1.73
EPITHELIAL CELLS: ABNORMAL PER HPF (ref 0–10)
GLUCOSE BLD-MCNC: NEGATIVE MG/DL
GLUCOSE: 115 MG/DL (ref 70–99)
HYALINE CASTS: ABNORMAL
KETONES, URINE: NEGATIVE
LEUKOCYTE ESTERASE, URINE: ABNORMAL
MICROALBUMIN/CREAT 24H UR: 3.3 MG/DL
MICROALBUMIN/CREAT UR-RTO: 15 MG/G
NITRITE, URINE: NEGATIVE
OCCULT BLOOD,URINE: ABNORMAL
PH: 5.5 (ref 5–9)
PHOSPHORUS: 3.5 MG/DL (ref 2.5–4.5)
POTASSIUM SERPL-SCNC: 4 MEQ/L (ref 3.5–5.4)
PROTEIN, URINE: 21 MG/DL
PROTEIN, URINE: ABNORMAL
RBC: ABNORMAL PER HPF (ref 0–5)
SODIUM BLD-SCNC: 138 MEQ/L (ref 133–146)
SP GRAVITY MISCELLANEOUS: 1.02 (ref 1–1.03)
TOTAL PROTEIN: 7.4 G/DL (ref 6.1–8.3)
UROBILINOGEN, URINE: 0.2
VITAMIN D 25-HYDROXY: 36 NG/ML
WBC: ABNORMAL PER HPF (ref 0–5)

## 2022-12-03 LAB — PARATHYROID HORMONE INTACT: 114 PG/ML (ref 15–65)

## 2022-12-08 ENCOUNTER — OFFICE VISIT (OUTPATIENT)
Dept: NEPHROLOGY | Age: 67
End: 2022-12-08
Payer: MEDICARE

## 2022-12-08 VITALS
HEART RATE: 78 BPM | BODY MASS INDEX: 31.75 KG/M2 | SYSTOLIC BLOOD PRESSURE: 111 MMHG | OXYGEN SATURATION: 100 % | WEIGHT: 215 LBS | DIASTOLIC BLOOD PRESSURE: 71 MMHG

## 2022-12-08 DIAGNOSIS — I10 ESSENTIAL HYPERTENSION: ICD-10-CM

## 2022-12-08 DIAGNOSIS — N18.30 STAGE 3 CHRONIC KIDNEY DISEASE, UNSPECIFIED WHETHER STAGE 3A OR 3B CKD (HCC): Primary | ICD-10-CM

## 2022-12-08 DIAGNOSIS — E83.52 HYPERCALCEMIA: ICD-10-CM

## 2022-12-08 PROCEDURE — 3074F SYST BP LT 130 MM HG: CPT | Performed by: INTERNAL MEDICINE

## 2022-12-08 PROCEDURE — 1123F ACP DISCUSS/DSCN MKR DOCD: CPT | Performed by: INTERNAL MEDICINE

## 2022-12-08 PROCEDURE — 99213 OFFICE O/P EST LOW 20 MIN: CPT | Performed by: INTERNAL MEDICINE

## 2022-12-08 PROCEDURE — 3078F DIAST BP <80 MM HG: CPT | Performed by: INTERNAL MEDICINE

## 2022-12-08 NOTE — PROGRESS NOTES
[facility]        Outpatient Follow-Up note         12/8/2022 9:18 AM    Patient Name:   Sepideh Jolly  YOB: 1955  Primary Care Physician:  Bettyjane Lundborg, APRN - CNP     Chief Complaint / Reason for follow-up : Follow Up of CKD     Interval History :  Patient seen and examined by me. Feels well. No cp or SOB. No urinary complaints  Had covid -19     Past History :  Past Medical History:   Diagnosis Date    Anemia     Carpal tunnel syndrome     right    Hyperlipidemia     Hypertension     Osteoarthritis     Thyroid disease     Torn rotator cuff     Right    Type II or unspecified type diabetes mellitus without mention of complication, not stated as uncontrolled      Past Surgical History:   Procedure Laterality Date    CERVICAL DISC SURGERY  2013    CHOLECYSTECTOMY  2009    NERVE SURGERY  2011    SHOULDER SURGERY Left 2007    VEIN SURGERY  2015    Varicose Vein        Medications :     Outpatient Medications Marked as Taking for the 12/8/22 encounter (Office Visit) with Elizabeth Raya MD   Medication Sig Dispense Refill    ALPRAZolam Olesya Cypher) 0.25 MG tablet Alprazolam Active 0.25 MG PO As needed December 29th, 2021 2:21pm HAS NOT TAKEN YET      ferrous sulfate (IRON 325) 325 (65 Fe) MG tablet Take 325 mg by mouth every other day MWF      Magnesium Oxide 500 MG TABS       NONFORMULARY Potassium 99      ezetimibe (ZETIA) 10 MG tablet Take 10 mg by mouth Once in dialysis      SYNTHROID 25 MCG tablet Take 25 mcg by mouth daily      atorvastatin (LIPITOR) 40 MG tablet TK 1 T PO QD      cyclobenzaprine (FLEXERIL) 10 MG tablet TK 1 T PO TID PRN  1    JANUMET  MG per tablet TAKE 1 TABLET TWICE A DAY WITH MEALS (IS TAKING BOTH JANUMET AND METFORMIN) 180 tablet 0    blood glucose test strips (ONETOUCH VERIO) strip 1 each by In Vitro route daily As needed.  100 each 3    ONE TOUCH LANCETS MISC 1 each by Does not apply route daily 100 each 3    omeprazole (PRILOSEC) 40 MG mellitus  well controlled. , 6.7   Arthritis she is on Plaquenil advised only take when necessary Naprosyn if required. Trace proteinuria currently on ACE inhibitor probably from diabetes. Hypercalcemia exact etiology unclear however calcium is stable PTH is slightly elevated to the degree of hypercalcemia however at this time we will follow advised to stay off of the calcium and vitamin D supplements for now. Pth 114 and vitamin d is 36. [facility]  Follow up in 12 months. Medications reviewed. Tests and orders placed this Encounter     Orders Placed This Encounter   Procedures    Comprehensive Metabolic Panel    Vitamin D 25 Hydroxy    PTH, Intact    Phosphorus         Og Rosa M.D  [facility].

## 2023-12-05 LAB
ALBUMIN SERPL-MCNC: 4.3 G/DL (ref 3.5–5.2)
ALK PHOSPHATASE: 140 U/L (ref 40–142)
ALT SERPL-CCNC: 24 U/L (ref 5–40)
ANION GAP SERPL CALCULATED.3IONS-SCNC: 12 MEQ/L (ref 7–16)
AST SERPL-CCNC: 23 U/L (ref 9–40)
BILIRUB SERPL-MCNC: 0.9 MG/DL
BUN BLDV-MCNC: 29 MG/DL (ref 8–23)
CALCIUM SERPL-MCNC: 10.3 MG/DL (ref 8.5–10.5)
CHLORIDE BLD-SCNC: 104 MEQ/L (ref 95–107)
CO2: 25 MEQ/L (ref 19–31)
CREAT SERPL-MCNC: 1.58 MG/DL (ref 0.6–1.3)
EGFR IF NONAFRICAN AMERICAN: 36 ML/MIN/1.73
GLUCOSE: 95 MG/DL (ref 70–99)
PHOSPHORUS: 3.6 MG/DL (ref 2.5–4.5)
POTASSIUM SERPL-SCNC: 4.7 MEQ/L (ref 3.5–5.4)
SODIUM BLD-SCNC: 141 MEQ/L (ref 133–146)
TOTAL PROTEIN: 7 G/DL (ref 6.1–8.3)
VITAMIN D 25-HYDROXY: 73 NG/ML

## 2023-12-06 LAB — PARATHYROID HORMONE INTACT: 111 PG/ML (ref 15–65)

## 2023-12-14 ENCOUNTER — OFFICE VISIT (OUTPATIENT)
Dept: NEPHROLOGY | Age: 68
End: 2023-12-14
Payer: MEDICARE

## 2023-12-14 VITALS
DIASTOLIC BLOOD PRESSURE: 63 MMHG | WEIGHT: 203.6 LBS | SYSTOLIC BLOOD PRESSURE: 117 MMHG | HEART RATE: 78 BPM | BODY MASS INDEX: 30.07 KG/M2

## 2023-12-14 DIAGNOSIS — N18.30 STAGE 3 CHRONIC KIDNEY DISEASE, UNSPECIFIED WHETHER STAGE 3A OR 3B CKD (HCC): Primary | ICD-10-CM

## 2023-12-14 DIAGNOSIS — I10 ESSENTIAL HYPERTENSION: ICD-10-CM

## 2023-12-14 DIAGNOSIS — E83.52 HYPERCALCEMIA: ICD-10-CM

## 2023-12-14 PROCEDURE — 1123F ACP DISCUSS/DSCN MKR DOCD: CPT | Performed by: INTERNAL MEDICINE

## 2023-12-14 PROCEDURE — 3078F DIAST BP <80 MM HG: CPT | Performed by: INTERNAL MEDICINE

## 2023-12-14 PROCEDURE — 3074F SYST BP LT 130 MM HG: CPT | Performed by: INTERNAL MEDICINE

## 2023-12-14 PROCEDURE — 99213 OFFICE O/P EST LOW 20 MIN: CPT | Performed by: INTERNAL MEDICINE

## 2023-12-14 RX ORDER — LISINOPRIL AND HYDROCHLOROTHIAZIDE 20; 12.5 MG/1; MG/1
1 TABLET ORAL DAILY
COMMUNITY
Start: 2023-10-02

## 2023-12-14 NOTE — PROGRESS NOTES
[facility]        Outpatient Follow-Up note         12/14/2023 9:26 AM    Patient Name:   Cleve Kee  Birthdate:    [birth year]  Primary Care Physician:  LEONA Mathis CNP     Chief Complaint / Reason for follow-up : Follow Up of CKD     Interval History :  Patient seen and examined by me. Feels well. No cp or SOB. No urinary complaints  Quinapril HCTZ d/c and Lisinopril HCTZ started        Past History :  Past Medical History:   Diagnosis Date    Anemia     Carpal tunnel syndrome     right    Hyperlipidemia     Hypertension     Osteoarthritis     Thyroid disease     Torn rotator cuff     Right    Type II or unspecified type diabetes mellitus without mention of complication, not stated as uncontrolled      Past Surgical History:   Procedure Laterality Date    CERVICAL DISC SURGERY  2013    CHOLECYSTECTOMY  2009    NERVE SURGERY  2011    SHOULDER SURGERY Left 2007    VEIN SURGERY  2015    Varicose Vein        Medications :     Outpatient Medications Marked as Taking for the 12/14/23 encounter (Office Visit) with Joseline Reina MD   Medication Sig Dispense Refill    lisinopril-hydroCHLOROthiazide (PRINZIDE;ZESTORETIC) 20-12.5 MG per tablet Take 1 tablet by mouth daily      Magnesium Oxide 500 MG TABS       NONFORMULARY Potassium 99      ezetimibe (ZETIA) 10 MG tablet Take 1 tablet by mouth daily      SYNTHROID 25 MCG tablet Take 1 tablet by mouth daily      atorvastatin (LIPITOR) 40 MG tablet TK 1 T PO QD      cyclobenzaprine (FLEXERIL) 10 MG tablet TK 1 T PO TID PRN  1    JANUMET  MG per tablet TAKE 1 TABLET TWICE A DAY WITH MEALS (IS TAKING BOTH JANUMET AND METFORMIN) 180 tablet 0    blood glucose test strips (ONETOUCH VERIO) strip 1 each by In Vitro route daily As needed.  100 each 3    ONE TOUCH LANCETS MISC 1 each by Does not apply route daily 100 each 3    omeprazole (PRILOSEC) 40 MG delayed release capsule Take 1 capsule by mouth daily      glucose blood

## 2024-01-16 LAB
BUN BLDV-MCNC: 27 MG/DL (ref 8–23)
CREAT SERPL-MCNC: 1.35 MG/DL (ref 0.6–1.3)
EGFR IF NONAFRICAN AMERICAN: 43 ML/MIN/1.73
POTASSIUM SERPL-SCNC: 5.3 MEQ/L (ref 3.5–5.4)

## 2024-06-16 LAB
ALBUMIN: 4.1 G/DL (ref 3.5–5.2)
ANION GAP SERPL CALCULATED.3IONS-SCNC: 16 MEQ/L (ref 7–16)
APPEARANCE: CLEAR
BACTERIA: ABNORMAL PER HPF
BILIRUB SERPL-MCNC: NEGATIVE MG/DL
BUN BLDV-MCNC: 24 MG/DL (ref 8–23)
CALCIUM SERPL-MCNC: 10.8 MG/DL (ref 8.5–10.5)
CHLORIDE BLD-SCNC: 104 MEQ/L (ref 95–107)
CO2: 22 MEQ/L (ref 19–31)
COLOR: YELLOW
CREAT SERPL-MCNC: 1.4 MG/DL (ref 0.6–1.3)
CREATINE, URINE: 124.9 MG/DL
CREATINE, URINE: 124.9 MG/DL
EPITHELIAL CELLS: ABNORMAL PER HPF (ref 0–10)
GLUCOSE BLD-MCNC: NEGATIVE MG/DL
GLUCOSE: 120 MG/DL (ref 70–99)
HYALINE CASTS: ABNORMAL
KETONES, URINE: NEGATIVE
LEUKOCYTE ESTERASE, URINE: ABNORMAL
MICROALBUMIN/CREAT 24H UR: 1.3 MG/DL
MICROALBUMIN/CREAT UR-RTO: 10 MG/G
NITRITE, URINE: NEGATIVE
OCCULT BLOOD,URINE: NEGATIVE
PH: 5.5 (ref 5–9)
PHOSPHORUS: 3.8 MG/DL (ref 2.5–4.5)
POTASSIUM SERPL-SCNC: 4.9 MEQ/L (ref 3.5–5.4)
PROTEIN, URINE: 11 MG/DL
PROTEIN, URINE: ABNORMAL
PROTEIN/CREAT RATIO: 88 MG/G
RBC # BLD: ABNORMAL PER HPF (ref 0–5)
SODIUM BLD-SCNC: 142 MEQ/L (ref 133–146)
SP GRAVITY MISCELLANEOUS: 1.02 (ref 1–1.03)
UROBILINOGEN, URINE: NORMAL
WBC # BLD: ABNORMAL PER HPF (ref 0–5)

## 2024-06-20 ENCOUNTER — OFFICE VISIT (OUTPATIENT)
Dept: NEPHROLOGY | Age: 69
End: 2024-06-20
Payer: MEDICARE

## 2024-06-20 VITALS
WEIGHT: 205.8 LBS | HEART RATE: 69 BPM | SYSTOLIC BLOOD PRESSURE: 140 MMHG | DIASTOLIC BLOOD PRESSURE: 84 MMHG | BODY MASS INDEX: 29.46 KG/M2 | HEIGHT: 70 IN | OXYGEN SATURATION: 98 %

## 2024-06-20 DIAGNOSIS — I10 ESSENTIAL HYPERTENSION: ICD-10-CM

## 2024-06-20 DIAGNOSIS — E83.52 HYPERCALCEMIA: ICD-10-CM

## 2024-06-20 DIAGNOSIS — N18.30 STAGE 3 CHRONIC KIDNEY DISEASE, UNSPECIFIED WHETHER STAGE 3A OR 3B CKD (HCC): Primary | ICD-10-CM

## 2024-06-20 PROCEDURE — G2211 COMPLEX E/M VISIT ADD ON: HCPCS | Performed by: INTERNAL MEDICINE

## 2024-06-20 PROCEDURE — 1123F ACP DISCUSS/DSCN MKR DOCD: CPT | Performed by: INTERNAL MEDICINE

## 2024-06-20 PROCEDURE — 3079F DIAST BP 80-89 MM HG: CPT | Performed by: INTERNAL MEDICINE

## 2024-06-20 PROCEDURE — 3077F SYST BP >= 140 MM HG: CPT | Performed by: INTERNAL MEDICINE

## 2024-06-20 PROCEDURE — 99213 OFFICE O/P EST LOW 20 MIN: CPT | Performed by: INTERNAL MEDICINE

## 2024-06-20 NOTE — PROGRESS NOTES
strip Check blood sugar 2 x daily (Dx: E11.9) 200 strip 1    ONETOUCH DELICA LANCETS FINE MISC Check blood sugar 2 x daily (Dx: E11.9) 200 each 1    aspirin 81 MG tablet Take 1 tablet by mouth daily      hydroxychloroquine (PLAQUENIL) 200 MG tablet Take 1 tablet by mouth 2 times daily         Vitals     BP (!) 140/84 (Site: Left Upper Arm, Position: Sitting, Cuff Size: Large Adult)   Pulse 69   Ht 1.778 m (5' 10\")   Wt 93.4 kg (205 lb 12.8 oz)   SpO2 98%   BMI 29.53 kg/m²  Wt Readings from Last 3 Encounters:   06/20/24 93.4 kg (205 lb 12.8 oz)   12/14/23 92.4 kg (203 lb 9.6 oz)   12/08/22 97.5 kg (215 lb)        Physical Exam     General -- no distress  Lungs -- clear  Heart -- S1, S2 heard, JVD - no  Abdomen - soft, non-tender  Extremities -- no edema  CNS - awake and alert    Labs, Radiology and Tests    Labs -    5/17 5/18 6/19 6/20 5/22 12/22 12/23 6/24    Potassium 4.7 4.6 4.4 4.5 4.4 4.0 4.7 4.9    BUN 26 15 25 20 21 15 29 24    Creatinine 1.05 1.18 1.26 1.32 1.2 1.32 1.58 1.4    eGFR 67 58 52 41 45 45 36 41                UPCR  103 130 <100    88    UMCR    <30   15 10                  11/21 - pth 85, vitamin d - 43  Renal Ultrasound Scan -- 6/2019  Rt kidney -9.2 cm, Lt kidney - 8.8.  Rt renal cyst  B/L cortical thinning consistant with MRD     Other : old lab data and endocrinology notes have been reviewed and noted that patient's baseline creatinine ranged anywhere between 1.01-1.3, within 2016.    Assessment    Renal - as per MDRD patient's GFR currently 45 ML/minute.  she is stage 3 for now  - CKD possibly from hypertension and diabetes.   - renal US scan shows MRD, no proteinuria noted .  -S overall creatinine is stable slight improvement and stable close to baseline  Essential hypertension-running well at this time continue current medications  Diabetes mellitus  well controlled.,  Had it for almost 20 years  Arthritis she is on Plaquenil advised only take when necessary Naprosyn if  information could not be obtained

## 2024-12-05 LAB
ANION GAP SERPL CALCULATED.3IONS-SCNC: 13 MMOL/L (ref 7–16)
BASOPHILS ABSOLUTE: 0.03 K/UL (ref 0–0.2)
BASOPHILS RELATIVE PERCENT: 0.4 % (ref 0–2)
BUN BLDV-MCNC: 21 MG/DL (ref 8–23)
CALCIUM SERPL-MCNC: 10.9 MG/DL (ref 8.6–10.5)
CHLORIDE BLD-SCNC: 104 MMOL/L (ref 96–107)
CO2: 23 MMOL/L (ref 18–32)
CREAT SERPL-MCNC: 1.46 MG/DL (ref 0.51–1.15)
EGFR IF NONAFRICAN AMERICAN: 39 ML/MIN/1.73M2
EOSINOPHILS ABSOLUTE: 0.11 K/UL (ref 0–0.8)
EOSINOPHILS RELATIVE PERCENT: 1.5 % (ref 0–5)
GLUCOSE: 195 MG/DL (ref 70–100)
HCT VFR BLD CALC: 33.7 % (ref 35–47)
HEMOGLOBIN: 10.1 G/DL (ref 11.9–16)
IMMATURE GRANS (ABS): 0.03 K/UL (ref 0–0.06)
IMMATURE GRANULOCYTES %: 0.4 % (ref 0–2)
LYMPHOCYTES ABSOLUTE: 2.31 K/UL (ref 0.9–5.2)
LYMPHOCYTES RELATIVE PERCENT: 30.6 % (ref 20–45)
MCH RBC QN AUTO: 21.8 PG (ref 26–33)
MCHC RBC AUTO-ENTMCNC: 30 G/DL (ref 32–35)
MCV RBC AUTO: 73 FL (ref 75–100)
MONOCYTES ABSOLUTE: 0.5 K/UL (ref 0.1–1)
MONOCYTES RELATIVE PERCENT: 6.6 % (ref 0–13)
NEUTROPHILS ABSOLUTE: 4.57 K/UL (ref 1.9–8)
NEUTROPHILS RELATIVE PERCENT: 60.5 % (ref 45–75)
PDW BLD-RTO: 17.6 % (ref 11.2–14.8)
PHOSPHORUS: 3.8 MG/DL (ref 2.5–4.5)
PLATELET # BLD: 263 THOUS/CMM (ref 140–440)
POTASSIUM SERPL-SCNC: 4.8 MMOL/L (ref 3.5–5.4)
PTH INTACT: 80 PG/ML (ref 12–65)
RBC # BLD: 4.64 MILL/CMM (ref 3.8–5.2)
SODIUM BLD-SCNC: 140 MMOL/L (ref 135–148)
VITAMIN D 25-HYDROXY: 39.1 NG/ML (ref 30–100)
WBC # BLD: 7.6 THDS/CMM (ref 3.6–11)

## 2024-12-18 ENCOUNTER — OFFICE VISIT (OUTPATIENT)
Dept: NEPHROLOGY | Age: 69
End: 2024-12-18

## 2024-12-18 VITALS
OXYGEN SATURATION: 96 % | SYSTOLIC BLOOD PRESSURE: 148 MMHG | BODY MASS INDEX: 29.99 KG/M2 | DIASTOLIC BLOOD PRESSURE: 90 MMHG | HEART RATE: 71 BPM | WEIGHT: 209 LBS

## 2024-12-18 DIAGNOSIS — E83.52 HYPERCALCEMIA: ICD-10-CM

## 2024-12-18 DIAGNOSIS — N18.30 STAGE 3 CHRONIC KIDNEY DISEASE, UNSPECIFIED WHETHER STAGE 3A OR 3B CKD (HCC): Primary | ICD-10-CM

## 2024-12-18 DIAGNOSIS — I10 ESSENTIAL HYPERTENSION: ICD-10-CM

## 2024-12-18 RX ORDER — LISINOPRIL 40 MG/1
40 TABLET ORAL DAILY
Qty: 90 TABLET | Refills: 1 | Status: SHIPPED | OUTPATIENT
Start: 2024-12-18

## 2024-12-18 NOTE — PROGRESS NOTES
[facility]        Outpatient Follow-Up note         12/18/2024 9:16 AM    Patient Name:   Maria M Hall  YOB: 1955  Primary Care Physician:  Bernadine Bergeron APRN - CNP     Chief Complaint / Reason for follow-up : Follow Up of CKD     Interval History :  Patient seen and examined by me. Feels well.  No cp or SOB. No urinary complaints  No hospitalizations protonic increased by pcp     Past History :  Past Medical History:   Diagnosis Date    Anemia     Carpal tunnel syndrome     right    Hyperlipidemia     Hypertension     Osteoarthritis     Thyroid disease     Torn rotator cuff     Right    Type II or unspecified type diabetes mellitus without mention of complication, not stated as uncontrolled      Past Surgical History:   Procedure Laterality Date    CERVICAL DISC SURGERY  2013    CHOLECYSTECTOMY  2009    NERVE SURGERY  2011    SHOULDER SURGERY Left 2007    VEIN SURGERY  2015    Varicose Vein        Medications :     Outpatient Medications Marked as Taking for the 12/18/24 encounter (Office Visit) with Josiah Cash MD   Medication Sig Dispense Refill    metFORMIN (GLUCOPHAGE) 1000 MG tablet Take 1 tablet by mouth 2 times daily      lisinopril-hydroCHLOROthiazide (PRINZIDE;ZESTORETIC) 20-12.5 MG per tablet Take 1 tablet by mouth daily      Magnesium Oxide 500 MG TABS       NONFORMULARY Potassium 99      ezetimibe (ZETIA) 10 MG tablet Take 1 tablet by mouth daily      SYNTHROID 25 MCG tablet Take 1 tablet by mouth daily      atorvastatin (LIPITOR) 40 MG tablet TK 1 T PO QD      cyclobenzaprine (FLEXERIL) 10 MG tablet TK 1 T PO TID PRN  1    omeprazole (PRILOSEC) 40 MG delayed release capsule Take 1 capsule by mouth 2 times daily      aspirin 81 MG tablet Take 1 tablet by mouth daily      hydroxychloroquine (PLAQUENIL) 200 MG tablet Take 1 tablet by mouth 2 times daily         Vitals     BP (!) 148/90 (Site: Left Upper Arm, Position: Sitting, Cuff Size: Medium

## 2024-12-31 LAB
ALBUMIN: 3.9 G/DL (ref 3.5–5.2)
ALK PHOSPHATASE: 136 U/L (ref 30–146)
ALT SERPL-CCNC: 15 U/L (ref 5–33)
ANION GAP SERPL CALCULATED.3IONS-SCNC: 12 MMOL/L (ref 7–16)
AST SERPL-CCNC: 19 U/L (ref 9–40)
BILIRUB SERPL-MCNC: 0.5 MG/DL
BUN BLDV-MCNC: 18 MG/DL (ref 8–23)
CALCIUM SERPL-MCNC: 10 MG/DL (ref 8.6–10.5)
CHLORIDE BLD-SCNC: 105 MMOL/L (ref 96–107)
CO2: 23 MMOL/L (ref 18–32)
CREAT SERPL-MCNC: 1.5 MG/DL (ref 0.51–1.15)
EGFR IF NONAFRICAN AMERICAN: 38 ML/MIN/1.73M2
GLUCOSE: 123 MG/DL (ref 70–100)
POTASSIUM SERPL-SCNC: 4.5 MMOL/L (ref 3.5–5.4)
SODIUM BLD-SCNC: 140 MMOL/L (ref 135–148)
TOTAL PROTEIN: 6.4 G/DL (ref 6–8.3)

## 2025-06-09 RX ORDER — LISINOPRIL 40 MG/1
40 TABLET ORAL DAILY
Qty: 90 TABLET | Refills: 0 | Status: SHIPPED | OUTPATIENT
Start: 2025-06-09

## 2025-07-16 LAB
CREATINE, URINE: 108.2 MG/DL
PHOSPHORUS: 3.2 MG/DL (ref 2.5–4.5)
PROTEIN, URINE: 14.7 MG/DL
PROTEIN/CREAT RATIO: 0.14 G/G (ref 0–0.2)
PTH INTACT: 138 PG/ML (ref 12–65)
VITAMIN D 25-HYDROXY: 45.6 NG/ML (ref 30–100)

## 2025-07-22 ENCOUNTER — OFFICE VISIT (OUTPATIENT)
Dept: NEPHROLOGY | Age: 70
End: 2025-07-22
Payer: MEDICARE

## 2025-07-22 VITALS
WEIGHT: 219 LBS | HEART RATE: 90 BPM | SYSTOLIC BLOOD PRESSURE: 152 MMHG | BODY MASS INDEX: 31.42 KG/M2 | DIASTOLIC BLOOD PRESSURE: 98 MMHG | OXYGEN SATURATION: 97 %

## 2025-07-22 DIAGNOSIS — E11.21 DIABETIC NEPHROPATHY WITH PROTEINURIA (HCC): ICD-10-CM

## 2025-07-22 DIAGNOSIS — E83.52 HYPERCALCEMIA: ICD-10-CM

## 2025-07-22 DIAGNOSIS — N18.30 STAGE 3 CHRONIC KIDNEY DISEASE, UNSPECIFIED WHETHER STAGE 3A OR 3B CKD (HCC): Primary | ICD-10-CM

## 2025-07-22 DIAGNOSIS — I10 ESSENTIAL HYPERTENSION: ICD-10-CM

## 2025-07-22 LAB
ALBUMIN SERPL BCG-MCNC: 3.5 G/DL (ref 3.4–4.9)
ALP SERPL-CCNC: 116 U/L (ref 38–126)
ALT SERPL W/O P-5'-P-CCNC: 26 U/L (ref 10–35)
ANION GAP SERPL CALC-SCNC: 11 MEQ/L (ref 8–16)
AST SERPL-CCNC: 21 U/L (ref 10–35)
BILIRUB SERPL-MCNC: 0.6 MG/DL (ref 0.3–1.2)
BUN SERPL-MCNC: 19 MG/DL (ref 8–23)
CALCIUM SERPL-MCNC: 10.1 MG/DL (ref 8.8–10.2)
CHLORIDE SERPL-SCNC: 107 MEQ/L (ref 98–111)
CO2 SERPL-SCNC: 22 MEQ/L (ref 22–29)
CREAT SERPL-MCNC: 1.6 MG/DL (ref 0.5–0.9)
GFR SERPL CREATININE-BSD FRML MDRD: 35 ML/MIN/1.73M2
GLUCOSE SERPL-MCNC: 179 MG/DL (ref 74–109)
POTASSIUM SERPL-SCNC: 5 MEQ/L (ref 3.5–5.2)
PROT SERPL-MCNC: 6.6 G/DL (ref 6.4–8.3)
SODIUM SERPL-SCNC: 140 MEQ/L (ref 135–145)

## 2025-07-22 PROCEDURE — 1159F MED LIST DOCD IN RCRD: CPT | Performed by: INTERNAL MEDICINE

## 2025-07-22 PROCEDURE — G2211 COMPLEX E/M VISIT ADD ON: HCPCS | Performed by: INTERNAL MEDICINE

## 2025-07-22 PROCEDURE — 1123F ACP DISCUSS/DSCN MKR DOCD: CPT | Performed by: INTERNAL MEDICINE

## 2025-07-22 PROCEDURE — 3080F DIAST BP >= 90 MM HG: CPT | Performed by: INTERNAL MEDICINE

## 2025-07-22 PROCEDURE — 3077F SYST BP >= 140 MM HG: CPT | Performed by: INTERNAL MEDICINE

## 2025-07-22 PROCEDURE — 99214 OFFICE O/P EST MOD 30 MIN: CPT | Performed by: INTERNAL MEDICINE

## 2025-07-22 RX ORDER — DAPAGLIFLOZIN 5 MG/1
5 TABLET, FILM COATED ORAL EVERY MORNING
COMMUNITY
Start: 2025-06-24

## 2025-07-22 NOTE — PROGRESS NOTES
[facility]        Outpatient Follow-Up note         7/22/2025 10:02 AM    Patient Name:   Maria M Hall  YOB: 1955  Primary Care Physician:  Bernadine Bergeron APRN - CNP     Chief Complaint / Reason for follow-up : Follow Up of CKD     Interval History :  Patient seen and examined by me. Feels well.  No cp or SOB.   No hospitalizations  Metformin increased from 500 daily   Farxiga started  Plaquenil d/c     Past History :  Past Medical History:   Diagnosis Date    Anemia     Carpal tunnel syndrome     right    Hyperlipidemia     Hypertension     Osteoarthritis     Thyroid disease     Torn rotator cuff     Right    Type II or unspecified type diabetes mellitus without mention of complication, not stated as uncontrolled      Past Surgical History:   Procedure Laterality Date    CERVICAL DISC SURGERY  2013    CHOLECYSTECTOMY  2009    NERVE SURGERY  2011    SHOULDER SURGERY Left 2007    VEIN SURGERY  2015    Varicose Vein        Medications :     Outpatient Medications Marked as Taking for the 7/22/25 encounter (Office Visit) with Josiah Cash MD   Medication Sig Dispense Refill    FARXIGA 5 MG tablet Take 1 tablet by mouth every morning      lisinopril (PRINIVIL;ZESTRIL) 40 MG tablet TAKE 1 TABLET BY MOUTH EVERY DAY 90 tablet 0    metFORMIN (GLUCOPHAGE) 1000 MG tablet Take 1 tablet by mouth 2 times daily      Magnesium Oxide 500 MG TABS       NONFORMULARY Potassium 99      ezetimibe (ZETIA) 10 MG tablet Take 1 tablet by mouth daily      SYNTHROID 25 MCG tablet Take 1 tablet by mouth daily      atorvastatin (LIPITOR) 40 MG tablet TK 1 T PO QD      cyclobenzaprine (FLEXERIL) 10 MG tablet TK 1 T PO TID PRN  1    omeprazole (PRILOSEC) 40 MG delayed release capsule Take 1 capsule by mouth 2 times daily      aspirin 81 MG tablet Take 1 tablet by mouth daily         Vitals     BP (!) 152/98 (BP Site: Left Upper Arm, Patient Position: Sitting, BP Cuff Size: Medium Adult)